# Patient Record
(demographics unavailable — no encounter records)

---

## 2024-10-09 NOTE — HISTORY OF PRESENT ILLNESS
[de-identified] : CC:  I was found to have a mass.   He is here at the request of Cuate Culver  This is a 82-year-old male with history of  hyperlipidemia, hypothyroidism, frequent APCs, non-sustained atrial Tachycardia, HTN, former smoker he was asked to see us to evaluate necrotic mass in the left perinephric space.   He had a CT scan done on 4/24/2024 in radiology services of [place] which demonstrated 4.5 x 3.5 x 4 cm solid enhancing mass with large area of central low attenuation change in the inferior aspect of the right perinephric space abuts the lower pole of the right kidney without interdigistation.  Distally in the lateral canal fascia there is a mass measuring 1.6 x 1.5 x 1.5 cm.   There is a large amount of retroperitoneal fat extending along the right lateral conal fascia into the right inguinal fossa  Differential on the CAT scan by the radiologist included sarcoma, renal cell carcinoma, lymphoma and of course a biopsy was recommended.  He had a CT abdo/pelvis in [facility] 2020: No acute traumatic abnormality within the chest abdomen and pelvis.  He feels well and has no complaints. [de-identified] : 6/13/24 He is here for follow up. He had a biopsy done on 5/29/24 that showed addendum.  This addendum is to report the results of ancillary studies and to update the final diagnosis. Final Diagnosis RIGHT PERINEPHRIC MASS; CT GUIDED CORE NEEDLE BIOPSY AND TOUCH IMPRINTS: - Diffuse large B-cell lymphoma, NOS. - Germinal center B-cell type. - Ki-67, > 90%. - P53 positive. - See comment. COMMENT: Large B-cell lymphoma FISH panel is in progress, and the results will be reported in an addendum. ANCILLARY STUDIES: Concurrent flow cytometry analysis ([id number]): - 41.0% monotypic B-cells are identified, consistent with B-cell lymphoproliferative disorder. Since the last time I saw him, he developed pain that is traveling into his right arm as well as had sciatica-like pain on the right side as well at this point away. He also noticed a lump on his scalp. Otherwise, no new complaints.  7/1/24 Patient is 82-year-old male is here for recently diagnosed Germinal center type diffuse large B cell Lymphoma. He also had a scalp lesion and right arm pain possibly from lymphoma. He was started on mini RCHOP. Tolerated cycle 1 well. His scalp lesion resolved with chemotherapy. He longer has pain in right arm.  7/18/2024 He returns for follow-up.  He is due for next cycle of chemotherapy tomorrow CXBC with just mild anemia. He feels well. No complaints.  08/08/2024 accompanied by his wife; Reports feeling well at the baseline of his health status, no changes in chronic conditions or new complaints since the last visit.  08/29/2024 accompanied by his wife and daughter; Reports feeling well at the baseline of his health status, no changes in chronic conditions or new complaints since the last visit.  09/19/2024 He is here today for follow-up accompanied by his wife.  They report he is having mild fatigue.  No other major issues.  10/9/2024 He is here for follow.  He has last cycle of chemotherapy for 10/11/2024. CBC today with just mild anemia. He had pain for a few days after chemotherapy started right after which sounds it was from Neulasta so I will add on Claritin.  More fatigue, muscle cramps also had a cough and is finally feeling better now he was COVID-negative

## 2024-10-09 NOTE — PHYSICAL EXAM
[Restricted in physically strenuous activity but ambulatory and able to carry out work of a light or sedentary nature] : Status 1- Restricted in physically strenuous activity but ambulatory and able to carry out work of a light or sedentary nature, e.g., light house work, office work [Normal] : affect appropriate [de-identified] : had about 1 cm a lump on scalp maybe lymphoma - scalp lump no longer palpable.

## 2024-10-09 NOTE — CONSULT LETTER
[Dear  ___] : Dear  [unfilled], [Consult Letter:] : I had the pleasure of evaluating your patient, [unfilled]. [Please see my note below.] : Please see my note below. [Consult Closing:] : Thank you very much for allowing me to participate in the care of this patient.  If you have any questions, please do not hesitate to contact me. [Sincerely,] : Sincerely, [FreeTextEntry3] : Abelardo

## 2024-10-09 NOTE — ASSESSMENT
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 Stage:  Stage IV (Numerous foci involving scalp, peritoneal, RP nodules and vertebra) Positive MYC rearrangement; No evidence of BCL 2 or BCL 6 translocation. CNS IPI High risk.  But would not offer CNS prophylaxis given there is lack of efficacy and unlikely would tolerate high-dose methotrexate after completion of chemotherapy. - 04/24/2024 CT scan: 4.5 x 3.5 x 4 cm solid enhancing mass with large area of central low attenuation change in the inferior aspect of the right perinephric space abuts the lower pole of the right kidney without interdigitation.  Distally in the lateral canal fascia there is a mass measuring 1.6 x 1.5 x 1.5 cm.  - Biopsy of the perinephric mass came back as Diffuse large B-cell lymphoma, NOS. Germinal center B-cell type. - 06/24/2024 PET scan: Numerous foci of pathological uptake compatible with tumor activity involving scalp, peritoneal and retroperitoneal soft tissue nodules, left perinephric soft tissue nodule, T1-T2 vertebra, right S1 and S 2 and mass like soft tissue thickening involving a short segment of small bowel within the pelvis. - 06/24/2024 ECHO - Normal EF 56%. - HIV, Hep negative. - LDH was just above normal, but hemolyzed. - 06/24/2024 Started on mini RCHOP - C1 Tolerated well. - 09/05/2024 CT C/A/P - No evidence of acute thoracic pathology. Old rib fractures. Since June 20, 2024, no new abdominal or pelvic adenopathy/soft tissue mass. Substantial interval improvement in previously noted peritoneal/retroperitoneal soft tissue nodules/masses, with a residual 3.3 cm right perinephric soft tissue mass. Substantial interval improvement in previously noted right sacral soft tissue mass, with mild residual soft tissue density involving the right S2 foramen. .  PLAN: - continue mini RCHOP for total of 6 cycles - C6/6 GIVE TOMORROW 10/11/2024 - continue supportive medications and knows how to use him. - repeat PET.CT  after completion of his treatment ordered - Labs today: CBC CMP -Also has insomnia he took his daughter's Ambien and slept well 10 mg so I agreed to give him Ambien for 2 weeks possibly not sleeping well because of the prednisone RTC in 3 weeks

## 2024-11-01 NOTE — PHYSICAL EXAM
[Restricted in physically strenuous activity but ambulatory and able to carry out work of a light or sedentary nature] : Status 1- Restricted in physically strenuous activity but ambulatory and able to carry out work of a light or sedentary nature, e.g., light house work, office work [Normal] : affect appropriate [de-identified] : had about 1 cm a lump on scalp maybe lymphoma - scalp lump no longer palpable.

## 2024-11-01 NOTE — PHYSICAL EXAM
[Restricted in physically strenuous activity but ambulatory and able to carry out work of a light or sedentary nature] : Status 1- Restricted in physically strenuous activity but ambulatory and able to carry out work of a light or sedentary nature, e.g., light house work, office work [Normal] : affect appropriate [de-identified] : had about 1 cm a lump on scalp maybe lymphoma - scalp lump no longer palpable.

## 2024-11-01 NOTE — HISTORY OF PRESENT ILLNESS
[de-identified] : CC:  I was found to have a mass.   He is here at the request of Cuate Culver  This is a 82-year-old male with history of  hyperlipidemia, hypothyroidism, frequent APCs, non-sustained atrial Tachycardia, HTN, former smoker he was asked to see us to evaluate necrotic mass in the left perinephric space.   He had a CT scan done on 4/24/2024 in radiology services of [place] which demonstrated 4.5 x 3.5 x 4 cm solid enhancing mass with large area of central low attenuation change in the inferior aspect of the right perinephric space abuts the lower pole of the right kidney without interdigistation.  Distally in the lateral canal fascia there is a mass measuring 1.6 x 1.5 x 1.5 cm.   There is a large amount of retroperitoneal fat extending along the right lateral conal fascia into the right inguinal fossa  Differential on the CAT scan by the radiologist included sarcoma, renal cell carcinoma, lymphoma and of course a biopsy was recommended.  He had a CT abdo/pelvis in [facility] 2020: No acute traumatic abnormality within the chest abdomen and pelvis.  He feels well and has no complaints. [de-identified] : 6/13/24 He is here for follow up. He had a biopsy done on 5/29/24 that showed addendum.  This addendum is to report the results of ancillary studies and to update the final diagnosis. Final Diagnosis RIGHT PERINEPHRIC MASS; CT GUIDED CORE NEEDLE BIOPSY AND TOUCH IMPRINTS: - Diffuse large B-cell lymphoma, NOS. - Germinal center B-cell type. - Ki-67, > 90%. - P53 positive. - See comment. COMMENT: Large B-cell lymphoma FISH panel is in progress, and the results will be reported in an addendum. ANCILLARY STUDIES: Concurrent flow cytometry analysis ([id number]): - 41.0% monotypic B-cells are identified, consistent with B-cell lymphoproliferative disorder. Since the last time I saw him, he developed pain that is traveling into his right arm as well as had sciatica-like pain on the right side as well at this point away. He also noticed a lump on his scalp. Otherwise, no new complaints.  7/1/24 Patient is 82-year-old male is here for recently diagnosed Germinal center type diffuse large B cell Lymphoma. He also had a scalp lesion and right arm pain possibly from lymphoma. He was started on mini RCHOP. Tolerated cycle 1 well. His scalp lesion resolved with chemotherapy. He longer has pain in right arm.  7/18/2024 He returns for follow-up.  He is due for next cycle of chemotherapy tomorrow CXBC with just mild anemia. He feels well. No complaints.  08/08/2024 accompanied by his wife; Reports feeling well at the baseline of his health status, no changes in chronic conditions or new complaints since the last visit.  08/29/2024 accompanied by his wife and daughter; Reports feeling well at the baseline of his health status, no changes in chronic conditions or new complaints since the last visit.  09/19/2024 He is here today for follow-up accompanied by his wife.  They report he is having mild fatigue.  No other major issues.  10/9/2024 He is here for follow.  He has last cycle of chemotherapy for 10/11/2024. CBC today with just mild anemia. He had pain for a few days after chemotherapy started right after which sounds it was from Neulasta so I will add on Claritin.  More fatigue, muscle cramps also had a cough and is finally feeling better now he was COVID-negative.  10/30/2024 He is here today for F/U accompanied by his wife and his daughter.  He remains to be extremely energetic at home.

## 2024-11-01 NOTE — END OF VISIT
[FreeTextEntry3] : I was physically present for the key portions of the evaluation and management service provided.  I agree with the history and physical, and plan which I have reviewed and edited where appropriate.  He is here for follow-up he completed his chemotherapy he is due for repeat PET scan on November 1 we will give him a call with results and he will come back in a few weeks to discuss as well if needed and to repeat blood work to make sure he is doing well.  Today blood work was done as well

## 2024-11-01 NOTE — ASSESSMENT
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 Stage:  Stage IV (Numerous foci involving scalp, peritoneal, RP nodules and vertebra) Positive MYC rearrangement; No evidence of BCL 2 or BCL 6 translocation. CNS IPI High risk.  But would not offer CNS prophylaxis given there is lack of efficacy and unlikely would tolerate high-dose methotrexate after completion of chemotherapy. - 04/24/2024 CT scan: 4.5 x 3.5 x 4 cm solid enhancing mass with large area of central low attenuation change in the inferior aspect of the right perinephric space abuts the lower pole of the right kidney without interdigitation.  Distally in the lateral canal fascia there is a mass measuring 1.6 x 1.5 x 1.5 cm.  - Biopsy of the perinephric mass came back as Diffuse large B-cell lymphoma, NOS. Germinal center B-cell type. - 06/24/2024 PET scan: Numerous foci of pathological uptake compatible with tumor activity involving scalp, peritoneal and retroperitoneal soft tissue nodules, left perinephric soft tissue nodule, T1-T2 vertebra, right S1 and S 2 and mass like soft tissue thickening involving a short segment of small bowel within the pelvis. - 06/24/2024 ECHO - Normal EF 56%. - HIV, Hep negative. - LDH was just above normal, but hemolyzed. - 06/24/2024 Started on mini RCHOP - C1 Tolerated well. - 09/05/2024 CT C/A/P - No evidence of acute thoracic pathology. Old rib fractures. Since June 20, 2024, no new abdominal or pelvic adenopathy/soft tissue mass. Substantial interval improvement in previously noted peritoneal/retroperitoneal soft tissue nodules/masses, with a residual 3.3 cm right perinephric soft tissue mass. Substantial interval improvement in previously noted right sacral soft tissue mass, with mild residual soft tissue density involving the right S2 foramen. - 10/11/2024 completed 6 cycles of mini RCHOP - tolerated well.  10/30/2024 Labs reviewed and results discussed with the patient his wife and daughter; he sleeps better with Ambien - remains clinically stable to continue current management. All questions were answered to satisfaction.  PLAN: - continue supportive medications PRN and knows how to use him. - repeat PET/CT after completion of his treatment - scheduled on 11/01/2024. - Labs today: CBC CMP RTC in 4 weeks with CBC CMP

## 2024-11-01 NOTE — HISTORY OF PRESENT ILLNESS
[de-identified] : CC:  I was found to have a mass.   He is here at the request of Cuate Culver  This is a 82-year-old male with history of  hyperlipidemia, hypothyroidism, frequent APCs, non-sustained atrial Tachycardia, HTN, former smoker he was asked to see us to evaluate necrotic mass in the left perinephric space.   He had a CT scan done on 4/24/2024 in radiology services of [place] which demonstrated 4.5 x 3.5 x 4 cm solid enhancing mass with large area of central low attenuation change in the inferior aspect of the right perinephric space abuts the lower pole of the right kidney without interdigistation.  Distally in the lateral canal fascia there is a mass measuring 1.6 x 1.5 x 1.5 cm.   There is a large amount of retroperitoneal fat extending along the right lateral conal fascia into the right inguinal fossa  Differential on the CAT scan by the radiologist included sarcoma, renal cell carcinoma, lymphoma and of course a biopsy was recommended.  He had a CT abdo/pelvis in [facility] 2020: No acute traumatic abnormality within the chest abdomen and pelvis.  He feels well and has no complaints. [de-identified] : 6/13/24 He is here for follow up. He had a biopsy done on 5/29/24 that showed addendum.  This addendum is to report the results of ancillary studies and to update the final diagnosis. Final Diagnosis RIGHT PERINEPHRIC MASS; CT GUIDED CORE NEEDLE BIOPSY AND TOUCH IMPRINTS: - Diffuse large B-cell lymphoma, NOS. - Germinal center B-cell type. - Ki-67, > 90%. - P53 positive. - See comment. COMMENT: Large B-cell lymphoma FISH panel is in progress, and the results will be reported in an addendum. ANCILLARY STUDIES: Concurrent flow cytometry analysis ([id number]): - 41.0% monotypic B-cells are identified, consistent with B-cell lymphoproliferative disorder. Since the last time I saw him, he developed pain that is traveling into his right arm as well as had sciatica-like pain on the right side as well at this point away. He also noticed a lump on his scalp. Otherwise, no new complaints.  7/1/24 Patient is 82-year-old male is here for recently diagnosed Germinal center type diffuse large B cell Lymphoma. He also had a scalp lesion and right arm pain possibly from lymphoma. He was started on mini RCHOP. Tolerated cycle 1 well. His scalp lesion resolved with chemotherapy. He longer has pain in right arm.  7/18/2024 He returns for follow-up.  He is due for next cycle of chemotherapy tomorrow CXBC with just mild anemia. He feels well. No complaints.  08/08/2024 accompanied by his wife; Reports feeling well at the baseline of his health status, no changes in chronic conditions or new complaints since the last visit.  08/29/2024 accompanied by his wife and daughter; Reports feeling well at the baseline of his health status, no changes in chronic conditions or new complaints since the last visit.  09/19/2024 He is here today for follow-up accompanied by his wife.  They report he is having mild fatigue.  No other major issues.  10/9/2024 He is here for follow.  He has last cycle of chemotherapy for 10/11/2024. CBC today with just mild anemia. He had pain for a few days after chemotherapy started right after which sounds it was from Neulasta so I will add on Claritin.  More fatigue, muscle cramps also had a cough and is finally feeling better now he was COVID-negative.  10/30/2024 He is here today for F/U accompanied by his wife and his daughter.  He remains to be extremely energetic at home.

## 2024-11-28 NOTE — PHYSICAL EXAM
[Restricted in physically strenuous activity but ambulatory and able to carry out work of a light or sedentary nature] : Status 1- Restricted in physically strenuous activity but ambulatory and able to carry out work of a light or sedentary nature, e.g., light house work, office work [de-identified] : had about 1 cm a lump on scalp maybe lymphoma - scalp lump no longer palpable. [Normal] : normal appearance, no rash, nodules, vesicles, ulcers, erythema

## 2024-11-28 NOTE — HISTORY OF PRESENT ILLNESS
[de-identified] : CC:  I was found to have a mass.   He is here at the request of Cuate Culver  This is a 82-year-old male with history of  hyperlipidemia, hypothyroidism, frequent APCs, non-sustained atrial Tachycardia, HTN, former smoker he was asked to see us to evaluate necrotic mass in the left perinephric space.   He had a CT scan done on 4/24/2024 in radiology services of [place] which demonstrated 4.5 x 3.5 x 4 cm solid enhancing mass with large area of central low attenuation change in the inferior aspect of the right perinephric space abuts the lower pole of the right kidney without interdigistation.  Distally in the lateral canal fascia there is a mass measuring 1.6 x 1.5 x 1.5 cm.   There is a large amount of retroperitoneal fat extending along the right lateral conal fascia into the right inguinal fossa  Differential on the CAT scan by the radiologist included sarcoma, renal cell carcinoma, lymphoma and of course a biopsy was recommended.  He had a CT abdo/pelvis in [facility] 2020: No acute traumatic abnormality within the chest abdomen and pelvis.  He feels well and has no complaints. [de-identified] : 6/13/24 He is here for follow up. He had a biopsy done on 5/29/24 that showed addendum.  This addendum is to report the results of ancillary studies and to update the final diagnosis. Final Diagnosis RIGHT PERINEPHRIC MASS; CT GUIDED CORE NEEDLE BIOPSY AND TOUCH IMPRINTS: - Diffuse large B-cell lymphoma, NOS. - Germinal center B-cell type. - Ki-67, > 90%. - P53 positive. - See comment. COMMENT: Large B-cell lymphoma FISH panel is in progress, and the results will be reported in an addendum. ANCILLARY STUDIES: Concurrent flow cytometry analysis ([id number]): - 41.0% monotypic B-cells are identified, consistent with B-cell lymphoproliferative disorder. Since the last time I saw him, he developed pain that is traveling into his right arm as well as had sciatica-like pain on the right side as well at this point away. He also noticed a lump on his scalp. Otherwise, no new complaints.  7/1/24 Patient is 82-year-old male is here for recently diagnosed Germinal center type diffuse large B cell Lymphoma. He also had a scalp lesion and right arm pain possibly from lymphoma. He was started on mini RCHOP. Tolerated cycle 1 well. His scalp lesion resolved with chemotherapy. He longer has pain in right arm.  7/18/2024 He returns for follow-up.  He is due for next cycle of chemotherapy tomorrow CXBC with just mild anemia. He feels well. No complaints.  08/08/2024 accompanied by his wife; Reports feeling well at the baseline of his health status, no changes in chronic conditions or new complaints since the last visit.  08/29/2024 accompanied by his wife and daughter; Reports feeling well at the baseline of his health status, no changes in chronic conditions or new complaints since the last visit.  09/19/2024 He is here today for follow-up accompanied by his wife.  They report he is having mild fatigue.  No other major issues.  10/9/2024 He is here for follow.  He has last cycle of chemotherapy for 10/11/2024. CBC today with just mild anemia. He had pain for a few days after chemotherapy started right after which sounds it was from Neulasta so I will add on Claritin.  More fatigue, muscle cramps also had a cough and is finally feeling better now he was COVID-negative.  10/30/2024 He is here today for F/U accompanied by his wife and his daughter.  He remains to be extremely energetic at home.  11/26/24 He is here for follow up.  He had PET.CT  complete resolution of all prior FDG avid lymph nodes and bone lesions.  FDG avid 3.3 cm right perinephric soft tissue mass is unchanged in size since CT on 9/5/2024, SUV max 4.1. It previously measured up to 4.8 cm with an SUV max of 30.5 (87% decrease).  Findings are consistent with a good treatment response.  Focal new FDG uptake, SUV max 5.4 seen towards the right posterior prostatic apex. This is indeterminate for biologic tumor activity.  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  Hands today on exam were darkish in color given his recent movement of equipment.  No new skin lesions and no adenopathy on examination.

## 2024-11-28 NOTE — ASSESSMENT
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 Stage:  Stage IV (Numerous foci involving scalp, peritoneal, RP nodules and vertebra) Positive MYC rearrangement; No evidence of BCL 2 or BCL 6 translocation. CNS IPI High risk.  But would not offer CNS prophylaxis given there is lack of efficacy and unlikely would tolerate high-dose methotrexate after completion of chemotherapy. - 04/24/2024 CT scan: 4.5 x 3.5 x 4 cm solid enhancing mass with large area of central low attenuation change in the inferior aspect of the right perinephric space abuts the lower pole of the right kidney without interdigitation.  Distally in the lateral canal fascia there is a mass measuring 1.6 x 1.5 x 1.5 cm.  - Biopsy of the perinephric mass came back as Diffuse large B-cell lymphoma, NOS. Germinal center B-cell type. - 06/24/2024 PET scan: Numerous foci of pathological uptake compatible with tumor activity involving scalp, peritoneal and retroperitoneal soft tissue nodules, left perinephric soft tissue nodule, T1-T2 vertebra, right S1 and S 2 and mass like soft tissue thickening involving a short segment of small bowel within the pelvis. - 06/24/2024 ECHO - Normal EF 56%. - HIV, Hep negative. - LDH was just above normal, but hemolyzed. - 06/24/2024 Started on mini RCHOP - C1 Tolerated well. - 09/05/2024 CT C/A/P - No evidence of acute thoracic pathology. Old rib fractures. Since June 20, 2024, no new abdominal or pelvic adenopathy/soft tissue mass. Substantial interval improvement in previously noted peritoneal/retroperitoneal soft tissue nodules/masses, with a residual 3.3 cm right perinephric soft tissue mass. Substantial interval improvement in previously noted right sacral soft tissue mass, with mild residual soft tissue density involving the right S2 foramen. - 10/11/2024 completed 6 cycles of mini RCHOP - tolerated well. -11/2024 PET.CT  NOAH  #Abnormal Foci in prostate on PET/CT  PLAN: - continue supportive medications PRN and knows how to use him. -blood work today  -will check PSA if elevated will have him see urology otherwise would just repeat PSA in 6-12 months and or her can see urology and get MR prostate  - Labs today: CBC CMP LDH PSA RTC in 3 months. Will possibly image again in 4-6 months vs just clinical following

## 2024-11-28 NOTE — HISTORY OF PRESENT ILLNESS
[de-identified] : CC:  I was found to have a mass.   He is here at the request of Cuate Culver  This is a 82-year-old male with history of  hyperlipidemia, hypothyroidism, frequent APCs, non-sustained atrial Tachycardia, HTN, former smoker he was asked to see us to evaluate necrotic mass in the left perinephric space.   He had a CT scan done on 4/24/2024 in radiology services of [place] which demonstrated 4.5 x 3.5 x 4 cm solid enhancing mass with large area of central low attenuation change in the inferior aspect of the right perinephric space abuts the lower pole of the right kidney without interdigistation.  Distally in the lateral canal fascia there is a mass measuring 1.6 x 1.5 x 1.5 cm.   There is a large amount of retroperitoneal fat extending along the right lateral conal fascia into the right inguinal fossa  Differential on the CAT scan by the radiologist included sarcoma, renal cell carcinoma, lymphoma and of course a biopsy was recommended.  He had a CT abdo/pelvis in [facility] 2020: No acute traumatic abnormality within the chest abdomen and pelvis.  He feels well and has no complaints. [de-identified] : 6/13/24 He is here for follow up. He had a biopsy done on 5/29/24 that showed addendum.  This addendum is to report the results of ancillary studies and to update the final diagnosis. Final Diagnosis RIGHT PERINEPHRIC MASS; CT GUIDED CORE NEEDLE BIOPSY AND TOUCH IMPRINTS: - Diffuse large B-cell lymphoma, NOS. - Germinal center B-cell type. - Ki-67, > 90%. - P53 positive. - See comment. COMMENT: Large B-cell lymphoma FISH panel is in progress, and the results will be reported in an addendum. ANCILLARY STUDIES: Concurrent flow cytometry analysis ([id number]): - 41.0% monotypic B-cells are identified, consistent with B-cell lymphoproliferative disorder. Since the last time I saw him, he developed pain that is traveling into his right arm as well as had sciatica-like pain on the right side as well at this point away. He also noticed a lump on his scalp. Otherwise, no new complaints.  7/1/24 Patient is 82-year-old male is here for recently diagnosed Germinal center type diffuse large B cell Lymphoma. He also had a scalp lesion and right arm pain possibly from lymphoma. He was started on mini RCHOP. Tolerated cycle 1 well. His scalp lesion resolved with chemotherapy. He longer has pain in right arm.  7/18/2024 He returns for follow-up.  He is due for next cycle of chemotherapy tomorrow CXBC with just mild anemia. He feels well. No complaints.  08/08/2024 accompanied by his wife; Reports feeling well at the baseline of his health status, no changes in chronic conditions or new complaints since the last visit.  08/29/2024 accompanied by his wife and daughter; Reports feeling well at the baseline of his health status, no changes in chronic conditions or new complaints since the last visit.  09/19/2024 He is here today for follow-up accompanied by his wife.  They report he is having mild fatigue.  No other major issues.  10/9/2024 He is here for follow.  He has last cycle of chemotherapy for 10/11/2024. CBC today with just mild anemia. He had pain for a few days after chemotherapy started right after which sounds it was from Neulasta so I will add on Claritin.  More fatigue, muscle cramps also had a cough and is finally feeling better now he was COVID-negative.  10/30/2024 He is here today for F/U accompanied by his wife and his daughter.  He remains to be extremely energetic at home.  11/26/24 He is here for follow up.  He had PET.CT  complete resolution of all prior FDG avid lymph nodes and bone lesions.  FDG avid 3.3 cm right perinephric soft tissue mass is unchanged in size since CT on 9/5/2024, SUV max 4.1. It previously measured up to 4.8 cm with an SUV max of 30.5 (87% decrease).  Findings are consistent with a good treatment response.  Focal new FDG uptake, SUV max 5.4 seen towards the right posterior prostatic apex. This is indeterminate for biologic tumor activity.  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  Hands today on exam were darkish in color given his recent movement of equipment.  No new skin lesions and no adenopathy on examination.

## 2024-11-28 NOTE — PHYSICAL EXAM
[Restricted in physically strenuous activity but ambulatory and able to carry out work of a light or sedentary nature] : Status 1- Restricted in physically strenuous activity but ambulatory and able to carry out work of a light or sedentary nature, e.g., light house work, office work [de-identified] : had about 1 cm a lump on scalp maybe lymphoma - scalp lump no longer palpable. [Normal] : normal appearance, no rash, nodules, vesicles, ulcers, erythema

## 2025-02-05 NOTE — ASSESSMENT
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 Stage:  Stage IV (Numerous foci involving scalp, peritoneal, RP nodules and vertebra) Positive MYC rearrangement; No evidence of BCL 2 or BCL 6 translocation. CNS IPI High risk.  But would not offer CNS prophylaxis given there is lack of efficacy and unlikely would tolerate high-dose methotrexate after completion of chemotherapy. - 04/24/2024 CT scan: 4.5 x 3.5 x 4 cm solid enhancing mass with large area of central low attenuation change in the inferior aspect of the right perinephric space abuts the lower pole of the right kidney without interdigitation.  Distally in the lateral canal fascia there is a mass measuring 1.6 x 1.5 x 1.5 cm.  - Biopsy of the perinephric mass came back as Diffuse large B-cell lymphoma, NOS. Germinal center B-cell type. - 06/24/2024 PET scan: Numerous foci of pathological uptake compatible with tumor activity involving scalp, peritoneal and retroperitoneal soft tissue nodules, left perinephric soft tissue nodule, T1-T2 vertebra, right S1 and S 2 and mass like soft tissue thickening involving a short segment of small bowel within the pelvis. - 06/24/2024 ECHO - Normal EF 56%. - HIV, Hep negative. - LDH was just above normal, but hemolyzed. - 06/24/2024 Started on mini RCHOP - C1 Tolerated well. - 09/05/2024 CT C/A/P - No evidence of acute thoracic pathology. Old rib fractures. Since June 20, 2024, no new abdominal or pelvic adenopathy/soft tissue mass. Substantial interval improvement in previously noted peritoneal/retroperitoneal soft tissue nodules/masses, with a residual 3.3 cm right perinephric soft tissue mass. Substantial interval improvement in previously noted right sacral soft tissue mass, with mild residual soft tissue density involving the right S2 foramen. - 10/11/2024 completed 6 cycles of mini RCHOP - tolerated well. - 11/01/2024 PET - Near complete resolution of all prior FDG avid lymph nodes and bone lesions. FDG avid 3.3 cm right perinephric soft tissue mass is unchanged in size since CT on 9/5/2024, SUV max 4.1. It previously measured up to 4.8 cm with an SUV max of 30.5 (87% decrease). Findings are consistent with a good treatment response. Focal new FDG uptake, SUV max 5.4 seen towards the right posterior prostatic apex. This is indeterminate for biologic tumor activity.  02/03/2025 Labs reviewed and results discussed with the patient and his wife - remains clinically stable to continue current management. All questions were answered to satisfaction.  PLAN: - continue PORT flush. - continue supportive medications PRN and knows how to use him. - repeat PET/CT - ordered. - Labs today: CBC CMP PSA RTC in 3 months with CBC CMP LDH

## 2025-02-05 NOTE — PHYSICAL EXAM
[Restricted in physically strenuous activity but ambulatory and able to carry out work of a light or sedentary nature] : Status 1- Restricted in physically strenuous activity but ambulatory and able to carry out work of a light or sedentary nature, e.g., light house work, office work [Normal] : affect appropriate [de-identified] : had about 1 cm a lump on scalp maybe lymphoma - scalp lump no longer palpable.

## 2025-02-05 NOTE — HISTORY OF PRESENT ILLNESS
[de-identified] : CC:  I was found to have a mass.   He is here at the request of Cuate Culver  This is a 82-year-old male with history of  hyperlipidemia, hypothyroidism, frequent APCs, non-sustained atrial Tachycardia, HTN, former smoker he was asked to see us to evaluate necrotic mass in the left perinephric space.   He had a CT scan done on 4/24/2024 in radiology services of [place] which demonstrated 4.5 x 3.5 x 4 cm solid enhancing mass with large area of central low attenuation change in the inferior aspect of the right perinephric space abuts the lower pole of the right kidney without interdigistation.  Distally in the lateral canal fascia there is a mass measuring 1.6 x 1.5 x 1.5 cm.   There is a large amount of retroperitoneal fat extending along the right lateral conal fascia into the right inguinal fossa  Differential on the CAT scan by the radiologist included sarcoma, renal cell carcinoma, lymphoma and of course a biopsy was recommended.  He had a CT abdo/pelvis in [facility] 2020: No acute traumatic abnormality within the chest abdomen and pelvis.  He feels well and has no complaints. [de-identified] : 6/13/24 He is here for follow up. He had a biopsy done on 5/29/24 that showed addendum.  This addendum is to report the results of ancillary studies and to update the final diagnosis. Final Diagnosis RIGHT PERINEPHRIC MASS; CT GUIDED CORE NEEDLE BIOPSY AND TOUCH IMPRINTS: - Diffuse large B-cell lymphoma, NOS. - Germinal center B-cell type. - Ki-67, > 90%. - P53 positive. - See comment. COMMENT: Large B-cell lymphoma FISH panel is in progress, and the results will be reported in an addendum. ANCILLARY STUDIES: Concurrent flow cytometry analysis ([id number]): - 41.0% monotypic B-cells are identified, consistent with B-cell lymphoproliferative disorder. Since the last time I saw him, he developed pain that is traveling into his right arm as well as had sciatica-like pain on the right side as well at this point away. He also noticed a lump on his scalp. Otherwise, no new complaints.  7/1/24 Patient is 82-year-old male is here for recently diagnosed Germinal center type diffuse large B cell Lymphoma. He also had a scalp lesion and right arm pain possibly from lymphoma. He was started on mini RCHOP. Tolerated cycle 1 well. His scalp lesion resolved with chemotherapy. He longer has pain in right arm.  7/18/2024 He returns for follow-up.  He is due for next cycle of chemotherapy tomorrow CXBC with just mild anemia. He feels well. No complaints.  08/08/2024 accompanied by his wife; Reports feeling well at the baseline of his health status, no changes in chronic conditions or new complaints since the last visit.  08/29/2024 accompanied by his wife and daughter; Reports feeling well at the baseline of his health status, no changes in chronic conditions or new complaints since the last visit.  09/19/2024 He is here today for follow-up accompanied by his wife.  They report he is having mild fatigue.  No other major issues.  10/9/2024 He is here for follow.  He has last cycle of chemotherapy for 10/11/2024. CBC today with just mild anemia. He had pain for a few days after chemotherapy started right after which sounds it was from Neulasta so I will add on Claritin.  More fatigue, muscle cramps also had a cough and is finally feeling better now he was COVID-negative.  10/30/2024 He is here today for F/U accompanied by his wife and his daughter.  He remains to be extremely energetic at home.  02/03/2025 accompanied by his wife Reports feeling well at the baseline of his health status, no changes in chronic conditions or new complaints since the last visit.

## 2025-02-05 NOTE — END OF VISIT
[FreeTextEntry3] : I was physically present for the key portions of the evaluation and management service provided.  I agree with the history and physical, and plan which I have reviewed and edited where appropriate.  Jean Claude is here for follow-up he is doing well blood work was done today he still has some activity in his last PET scan so we will repeat PET scan again if NOAH confirmed then we will just monitor with occasional CAT scans versus just wait for signs of symptoms he could see me back in 3 months with CBC CMP and LDH

## 2025-02-05 NOTE — PHYSICAL EXAM
[Restricted in physically strenuous activity but ambulatory and able to carry out work of a light or sedentary nature] : Status 1- Restricted in physically strenuous activity but ambulatory and able to carry out work of a light or sedentary nature, e.g., light house work, office work [Normal] : affect appropriate [de-identified] : had about 1 cm a lump on scalp maybe lymphoma - scalp lump no longer palpable.

## 2025-02-05 NOTE — PHYSICAL EXAM
[Restricted in physically strenuous activity but ambulatory and able to carry out work of a light or sedentary nature] : Status 1- Restricted in physically strenuous activity but ambulatory and able to carry out work of a light or sedentary nature, e.g., light house work, office work [Normal] : affect appropriate [de-identified] : had about 1 cm a lump on scalp maybe lymphoma - scalp lump no longer palpable.

## 2025-02-05 NOTE — HISTORY OF PRESENT ILLNESS
[de-identified] : CC:  I was found to have a mass.   He is here at the request of Cuate Culver  This is a 82-year-old male with history of  hyperlipidemia, hypothyroidism, frequent APCs, non-sustained atrial Tachycardia, HTN, former smoker he was asked to see us to evaluate necrotic mass in the left perinephric space.   He had a CT scan done on 4/24/2024 in radiology services of [place] which demonstrated 4.5 x 3.5 x 4 cm solid enhancing mass with large area of central low attenuation change in the inferior aspect of the right perinephric space abuts the lower pole of the right kidney without interdigistation.  Distally in the lateral canal fascia there is a mass measuring 1.6 x 1.5 x 1.5 cm.   There is a large amount of retroperitoneal fat extending along the right lateral conal fascia into the right inguinal fossa  Differential on the CAT scan by the radiologist included sarcoma, renal cell carcinoma, lymphoma and of course a biopsy was recommended.  He had a CT abdo/pelvis in [facility] 2020: No acute traumatic abnormality within the chest abdomen and pelvis.  He feels well and has no complaints. [de-identified] : 6/13/24 He is here for follow up. He had a biopsy done on 5/29/24 that showed addendum.  This addendum is to report the results of ancillary studies and to update the final diagnosis. Final Diagnosis RIGHT PERINEPHRIC MASS; CT GUIDED CORE NEEDLE BIOPSY AND TOUCH IMPRINTS: - Diffuse large B-cell lymphoma, NOS. - Germinal center B-cell type. - Ki-67, > 90%. - P53 positive. - See comment. COMMENT: Large B-cell lymphoma FISH panel is in progress, and the results will be reported in an addendum. ANCILLARY STUDIES: Concurrent flow cytometry analysis ([id number]): - 41.0% monotypic B-cells are identified, consistent with B-cell lymphoproliferative disorder. Since the last time I saw him, he developed pain that is traveling into his right arm as well as had sciatica-like pain on the right side as well at this point away. He also noticed a lump on his scalp. Otherwise, no new complaints.  7/1/24 Patient is 82-year-old male is here for recently diagnosed Germinal center type diffuse large B cell Lymphoma. He also had a scalp lesion and right arm pain possibly from lymphoma. He was started on mini RCHOP. Tolerated cycle 1 well. His scalp lesion resolved with chemotherapy. He longer has pain in right arm.  7/18/2024 He returns for follow-up.  He is due for next cycle of chemotherapy tomorrow CXBC with just mild anemia. He feels well. No complaints.  08/08/2024 accompanied by his wife; Reports feeling well at the baseline of his health status, no changes in chronic conditions or new complaints since the last visit.  08/29/2024 accompanied by his wife and daughter; Reports feeling well at the baseline of his health status, no changes in chronic conditions or new complaints since the last visit.  09/19/2024 He is here today for follow-up accompanied by his wife.  They report he is having mild fatigue.  No other major issues.  10/9/2024 He is here for follow.  He has last cycle of chemotherapy for 10/11/2024. CBC today with just mild anemia. He had pain for a few days after chemotherapy started right after which sounds it was from Neulasta so I will add on Claritin.  More fatigue, muscle cramps also had a cough and is finally feeling better now he was COVID-negative.  10/30/2024 He is here today for F/U accompanied by his wife and his daughter.  He remains to be extremely energetic at home.  02/03/2025 accompanied by his wife Reports feeling well at the baseline of his health status, no changes in chronic conditions or new complaints since the last visit.

## 2025-03-24 NOTE — HISTORY OF PRESENT ILLNESS
[TextBox_4] : 3/24/2025: Sent in from oncology because he has a history of large B-cell lymphoma.  PET scan showed possible uptake in the left hilar region.  To my read there is no corresponding lymph node or structure in the bronchus.  We will plan however for bronchoscopy with endobronchial ultrasound possibly.

## 2025-03-24 NOTE — END OF VISIT
[] : Fellow [FreeTextEntry3] : Not interested in ALE testing or treating \par  Convinced him to get home sleep study \par  Given smoking history will get LDCT for lung cancer screening \par  Otherwise no respiratory issues \par  F/U in 2 months

## 2025-03-24 NOTE — ASSESSMENT
[FreeTextEntry1] : Questionable hilar adenopathy on the left side with PET scan showing uptake Plan for bronchoscopy for airway inspection as well as endobronchial ultrasound with possible FNA of the left hilar lymph node Will call with results

## 2025-04-10 NOTE — HISTORY OF PRESENT ILLNESS
[de-identified] : CC:  I was found to have a mass.   He is here at the request of Cuate Culver  This is a 82-year-old male with history of  hyperlipidemia, hypothyroidism, frequent APCs, non-sustained atrial Tachycardia, HTN, former smoker he was asked to see us to evaluate necrotic mass in the left perinephric space.   He had a CT scan done on 4/24/2024 in radiology services of [place] which demonstrated 4.5 x 3.5 x 4 cm solid enhancing mass with large area of central low attenuation change in the inferior aspect of the right perinephric space abuts the lower pole of the right kidney without interdigistation.  Distally in the lateral canal fascia there is a mass measuring 1.6 x 1.5 x 1.5 cm.   There is a large amount of retroperitoneal fat extending along the right lateral conal fascia into the right inguinal fossa  Differential on the CAT scan by the radiologist included sarcoma, renal cell carcinoma, lymphoma and of course a biopsy was recommended.  He had a CT abdo/pelvis in [facility] 2020: No acute traumatic abnormality within the chest abdomen and pelvis.  He feels well and has no complaints. [de-identified] : 6/13/24 He is here for follow up. He had a biopsy done on 5/29/24 that showed addendum.  This addendum is to report the results of ancillary studies and to update the final diagnosis. Final Diagnosis RIGHT PERINEPHRIC MASS; CT GUIDED CORE NEEDLE BIOPSY AND TOUCH IMPRINTS: - Diffuse large B-cell lymphoma, NOS. - Germinal center B-cell type. - Ki-67, > 90%. - P53 positive. - See comment. COMMENT: Large B-cell lymphoma FISH panel is in progress, and the results will be reported in an addendum. ANCILLARY STUDIES: Concurrent flow cytometry analysis ([id number]): - 41.0% monotypic B-cells are identified, consistent with B-cell lymphoproliferative disorder. Since the last time I saw him, he developed pain that is traveling into his right arm as well as had sciatica-like pain on the right side as well at this point away. He also noticed a lump on his scalp. Otherwise, no new complaints.  7/1/24 Patient is 82-year-old male is here for recently diagnosed Germinal center type diffuse large B cell Lymphoma. He also had a scalp lesion and right arm pain possibly from lymphoma. He was started on mini RCHOP. Tolerated cycle 1 well. His scalp lesion resolved with chemotherapy. He longer has pain in right arm.  7/18/2024 He returns for follow-up.  He is due for next cycle of chemotherapy tomorrow CXBC with just mild anemia. He feels well. No complaints.  08/08/2024 accompanied by his wife; Reports feeling well at the baseline of his health status, no changes in chronic conditions or new complaints since the last visit.  08/29/2024 accompanied by his wife and daughter; Reports feeling well at the baseline of his health status, no changes in chronic conditions or new complaints since the last visit.  09/19/2024 He is here today for follow-up accompanied by his wife.  They report he is having mild fatigue.  No other major issues.  10/9/2024 He is here for follow.  He has last cycle of chemotherapy for 10/11/2024. CBC today with just mild anemia. He had pain for a few days after chemotherapy started right after which sounds it was from Neulasta so I will add on Claritin.  More fatigue, muscle cramps also had a cough and is finally feeling better now he was COVID-negative.  10/30/2024 He is here today for F/U accompanied by his wife and his daughter.  He remains to be extremely energetic at home.  02/03/2025 accompanied by his wife Reports feeling well at the baseline of his health status, no changes in chronic conditions or new complaints since the last visit.  4/10/2025 He is here for follow-up.  Since last visit he had a biopsy done by IR of the residual right-sided perinephric mass it just shows inflammation.  He then underwent an EBUS with Dr. Mohr.  He rosa maria out his left hemithorax a CT chest was done it showed reexpansion as well as MPRESSION:  There is a 4.9 x 4.6 x 3.7 cm mass arising in the left hilar region with encasement/invasion and narrowing of the left lower lobe bronchus, and encasement of the left upper lobe bronchus.  Compared with the chest x-ray of 4/7/2024 at 9:08 AM, there has been reexpansion of the left lung. Mild residual atelectatic changes are noted at the left lung base.  This was on 4/7/2025  The final pathology is not back yet but flow cytometry from the specimen level 2 node shows agnosis/Interpretation:     -  Immunophenotypic analysis reveals 5.82% kappa light chain-restricted, monotypic, mature B cells of large cell size, consistent with a B-cell lymphoma.  Additionally, there is an increase in CD8(+)/CD57(bright +) T cells (6.30% of the total cells and 46% of the T cell population. Please refer to the Note for clinical indications.  mmunophenotypic Findings: Immunophenotypic analysis reveals: 1. 5.82% monotypic, mature B lineage cells with intermediate to high forward scatter, low side scatter and the following immunophenotype: CD5 (-), CD10 (-), CD19 (+), CD20 (+), CD23 (-), CD38 (+), CD45 (dim +),  (-), surface kappa (+), surface lambda (-).  2. 6.30% CD8 (+)/CD57 (bright +) T cells (accounting for 46% of the total T cells) including 1.17% clonal gamma delta T cells showing TRBC1 (-), CD2 (dim +), and CD5 (dim+).  His main complaint is shortness of breath when he is going upstairs and wheezing he is taking a prednisone taper

## 2025-04-10 NOTE — PHYSICAL EXAM
[Restricted in physically strenuous activity but ambulatory and able to carry out work of a light or sedentary nature] : Status 1- Restricted in physically strenuous activity but ambulatory and able to carry out work of a light or sedentary nature, e.g., light house work, office work [Normal] : no JVD, no calf tenderness, venous stasis changes, varices [de-identified] : He has wheezing mainly in the left lungs [de-identified] : Port is fine [de-identified] : had about 1 cm a lump on scalp maybe lymphoma - scalp lump no longer palpable.

## 2025-04-10 NOTE — REVIEW OF SYSTEMS
[Fatigue] : fatigue [Negative] : Allergic/Immunologic [Wheezing] : wheezing [SOB on Exertion] : shortness of breath during exertion

## 2025-04-10 NOTE — ASSESSMENT
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 Stage:  Stage IV (Numerous foci involving scalp, peritoneal, RP nodules and vertebra) Positive MYC rearrangement; No evidence of BCL 2 or BCL 6 translocation. CNS IPI High risk.  But would not offer CNS prophylaxis given there is lack of efficacy and unlikely would tolerate high-dose methotrexate after completion of chemotherapy. - 04/24/2024 CT scan: 4.5 x 3.5 x 4 cm solid enhancing mass with large area of central low attenuation change in the inferior aspect of the right perinephric space abuts the lower pole of the right kidney without interdigitation.  Distally in the lateral canal fascia there is a mass measuring 1.6 x 1.5 x 1.5 cm.  - Biopsy of the perinephric mass came back as Diffuse large B-cell lymphoma, NOS. Germinal center B-cell type. - 06/24/2024 PET scan: Numerous foci of pathological uptake compatible with tumor activity involving scalp, peritoneal and retroperitoneal soft tissue nodules, left perinephric soft tissue nodule, T1-T2 vertebra, right S1 and S 2 and mass like soft tissue thickening involving a short segment of small bowel within the pelvis. - 06/24/2024 ECHO - Normal EF 56%. - HIV, Hep negative. - LDH was just above normal, but hemolyzed. - 06/24/2024 Started on mini RCHOP - C1 Tolerated well. - 09/05/2024 CT C/A/P - No evidence of acute thoracic pathology. Old rib fractures. Since June 20, 2024, no new abdominal or pelvic adenopathy/soft tissue mass. Substantial interval improvement in previously noted peritoneal/retroperitoneal soft tissue nodules/masses, with a residual 3.3 cm right perinephric soft tissue mass. Substantial interval improvement in previously noted right sacral soft tissue mass, with mild residual soft tissue density involving the right S2 foramen. - 10/11/2024 completed 6 cycles of mini RCHOP - tolerated well. - PET.CT 2/10 pathologic FDG uptake within the subaortic region, Persistent mild FDG uptake within a slightly decreased size 2.1 cm right perinephric soft tissue nodule, with max SUV 3.5 (previously 2.4 cm.) -EBUS4/7/2025 FLow is positive cytopathylogy still pending  PLAN: -would offer palliative radiation to Left Hilar mass to keep air way open they will make an appointment today as they are leaving -awaiting final path as well -we went over second line options - Liso-deloris  (CART), Bispecific antibody therapy ,  Glofitamab  or  Epcoritamab  -we dont have CART or  Bispecific antibody therapy in [facility] -I can offer Polatuzumab/bendamustine/rituximab vs  Tafasitamab/lenalidomide vs Loncastuximab.  I explained I should be able to obtain this medications - After discussion about CAR-T he really does not want to be admitted for long duration of time he is aware that it could lead to long-term survival benefit but he declined for referral - And after an extensive discussion about different efficacy different treatments we settled on  Tafasitamab/lenalidomide he does not have primary refractory disease or double hit  Tafasitamab is given 12 mg/kg by intravenous infusion according to the following schedule in 28-day cycles [19]:  -Cycle 1: Days 1, 4, 8, 15, and 22  -Cycles 2 and 3: Days 1, 8, 15, and 22  -Cycle 4 and beyond: Days 1 and 15  Lenalidomide 10 mg by mouth on days 1 to 21 of each cycle is taken in combination with tafasitamab for a maximum of 12 cycles.  We will start with 10 mg dose I will titrate if he tolerates it -Toxicity - Grade 3 AEs occurred in one-half of patients, including infusion reactions (mostly in the first two cycles), neutropenia (49 percent), infections (26 percent), pneumonia (7 percent), and febrile neutropenia (6 percent)  -Fatal AEs occurred in 5 percent of patients, including cerebrovascular accident, respiratory failure, progressive multifocal leukoencephalopathy, and sudden death.  Outcomes - The phase 2 L-MIND study reported that among 80 patients with r/r DLBCL treated with tafasitamab plus lenalidomide, SOMMERS was 60 percent (43 percent CR) with 22-month duration of response (BARRON) median overall survival (OS) was 34 months, median BARRON was nearly 44 months, and for patients who achieved  CR, three-year OS was >80 percent  Supportive medications such as Imodium were provided  I will see him back with cycle 1 day 15 [Palliative] : Goals of care discussed with patient: Palliative [Palliative Care Plan] : Patient was apprised of incurable nature of disease.  Hospice and Palliative care options discussed. [With Patient/Caregiver] : With Patient/Caregiver [AdvancecareDate] : 4/10/2025 [Full Code] : full code

## 2025-04-14 NOTE — REVIEW OF SYSTEMS
[Shortness Of Breath] : shortness of breath [Wheezing] : wheezing [Anxiety] : anxiety [Negative] : Allergic/Immunologic

## 2025-04-16 NOTE — VITALS
[8 - Distress Level] : Distress Level: 8 [Referred Patient  to social work for follow-up] : Patient was referred to social work for follow-up [Date: ____________] : Patient's last distress assessment performed on [unfilled]. [Maximal Pain Intensity: 0/10] : 0/10 [80: Normal activity with effort; some signs or symptoms of disease.] : 80: Normal activity with effort; some signs or symptoms of disease.  [FreeTextEntry7] : Patient is under a lot of stress; he is currently caring for wife who recently had a stroke.

## 2025-04-16 NOTE — HISTORY OF PRESENT ILLNESS
[FreeTextEntry1] : This is a 82-year-old male with history of hyperlipidemia, hypothyroidism, frequent APCs, non-sustained atrial Tachycardia, HTN, former smoker he was asked to see us to evaluate necrotic mass in the left perinephric space.  He had a CT scan done on 4/24/2024 in radiology services of [place] which demonstrated 4.5 x 3.5 x 4 cm solid enhancing mass with large area of central low attenuation change in the inferior aspect of the right perinephric space abuts the lower pole of the right kidney without interdigistation. Distally in the lateral canal fascia there is a mass measuring 1.6 x 1.5 x 1.5 cm.  There is a large amount of retroperitoneal fat extending along the right lateral conal fascia into the right inguinal fossa  Differential on the CAT scan by the radiologist included sarcoma, renal cell carcinoma, lymphoma and of course a biopsy was recommended.  He had a CT abdo/pelvis in [facility] 2020: No acute traumatic abnormality within the chest abdomen and pelvis.  He feels well and has no complaints.   Biopsy 5/29/24 RIGHT PERINEPHRIC MASS; CT GUIDED CORE NEEDLE BIOPSY AND TOUCH IMPRINTS: - Diffuse large B-cell lymphoma, NOS. - Germinal center B-cell type. - Ki-67, > 90%. - P53 positive.  10/11/2024 completed 6 cycles of mini RCHOP - tolerated well. 11/01/2024 PET - Near complete resolution of all prior FDG avid lymph nodes and bone lesion   PET.CT 2/10 pathologic FDG uptake within the subaortic region, Persistent mild FDG uptake within a slightly decreased size 2.1 cm right perinephric soft tissue nodule, with max SUV 3.5 (previously 2.4 cm.) he had a biopsy done by IR of the residual right-sided perinephric mass it just shows inflammation. He then underwent an EBUS with Dr. Mohr. He rosa maria out his left hemithorax a CT chest was done it showed reexpansion as well as MPRESSION:  There is a 4.9 x 4.6 x 3.7 cm mass arising in the left hilar region with encasement/invasion and narrowing of the left lower lobe bronchus, and encasement of the left upper lobe bronchus.  Pathology showed  Immunophenotypic analysis reveals 5.82% kappa light chain-restricted, monotypic, mature B cells of large cell size,  His main complaint is shortness of breath and cough which worsened after his biopsy He developed pneumothorax after his biopsy and was in the hospital last weekend.  Dr. Anand discussed 2nd line therapy with him at his last visit

## 2025-04-16 NOTE — HISTORY OF PRESENT ILLNESS
[FreeTextEntry1] : This is a 82-year-old male with history of hyperlipidemia, hypothyroidism, frequent APCs, non-sustained atrial Tachycardia, HTN, former smoker he was asked to see us to evaluate necrotic mass in the left perinephric space.  He had a CT scan done on 4/24/2024 in radiology services of [place] which demonstrated 4.5 x 3.5 x 4 cm solid enhancing mass with large area of central low attenuation change in the inferior aspect of the right perinephric space abuts the lower pole of the right kidney without interdigistation. Distally in the lateral canal fascia there is a mass measuring 1.6 x 1.5 x 1.5 cm.  There is a large amount of retroperitoneal fat extending along the right lateral conal fascia into the right inguinal fossa  Differential on the CAT scan by the radiologist included sarcoma, renal cell carcinoma, lymphoma and of course a biopsy was recommended.  He had a CT abdo/pelvis in [facility] 2020: No acute traumatic abnormality within the chest abdomen and pelvis.  He feels well and has no complaints.   Biopsy 5/29/24 RIGHT PERINEPHRIC MASS; CT GUIDED CORE NEEDLE BIOPSY AND TOUCH IMPRINTS: - Diffuse large B-cell lymphoma, NOS. - Germinal center B-cell type. - Ki-67, > 90%. - P53 positive.  10/11/2024 completed 6 cycles of mini RCHOP - tolerated well. 11/01/2024 PET - Near complete resolution of all prior FDG avid lymph nodes and bone lesion   PET.CT 2/10 pathologic FDG uptake within the subaortic region, Persistent mild FDG uptake within a slightly decreased size 2.1 cm right perinephric soft tissue nodule, with max SUV 3.5 (previously 2.4 cm.) he had a biopsy done by IR of the residual right-sided perinephric mass it just shows inflammation. He then underwent an EBUS with Dr. Mohr. He rosa maria out his left hemithorax a CT chest was done it showed reexpansion as well as MPRESSION:  There is a 4.9 x 4.6 x 3.7 cm mass arising in the left hilar region with encasement/invasion and narrowing of the left lower lobe bronchus, and encasement of the left upper lobe bronchus.  Pathology showed  Immunophenotypic analysis reveals 5.82% kappa light chain-restricted, monotypic, mature B cells of large cell size,  His main complaint is shortness of breath and cough which worsened after his biopsy He developed pneumothorax after his biopsy and was in the hospital last weekend.  Dr. Anand discussed 2nd line therapy with him at his last visit

## 2025-04-16 NOTE — PHYSICAL EXAM
[] : no respiratory distress [Respiration, Rhythm And Depth] : normal respiratory rhythm and effort [Exaggerated Use Of Accessory Muscles For Inspiration] : no accessory muscle use [Abdomen Soft] : soft [Nondistended] : nondistended [Normal] : oriented to person, place and time, the affect was normal, the mood was normal and not anxious [de-identified] : L sided wheezes

## 2025-04-16 NOTE — PHYSICAL EXAM
[] : no respiratory distress [Respiration, Rhythm And Depth] : normal respiratory rhythm and effort [Exaggerated Use Of Accessory Muscles For Inspiration] : no accessory muscle use [Abdomen Soft] : soft [Nondistended] : nondistended [Normal] : oriented to person, place and time, the affect was normal, the mood was normal and not anxious [de-identified] : L sided wheezes

## 2025-05-05 NOTE — ASSESSMENT
[Palliative] : Goals of care discussed with patient: Palliative [Palliative Care Plan] : Patient was apprised of incurable nature of disease.  Hospice and Palliative care options discussed. 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 Stage:  Stage IV (Numerous foci involving scalp, peritoneal, RP nodules and vertebra) Positive MYC rearrangement; No evidence of BCL 2 or BCL 6 translocation. CNS IPI High risk.  But would not offer CNS prophylaxis given there is lack of efficacy and unlikely would tolerate high-dose methotrexate after completion of chemotherapy. - 04/24/2024 CT scan: 4.5 x 3.5 x 4 cm solid enhancing mass with large area of central low attenuation change in the inferior aspect of the right perinephric space abuts the lower pole of the right kidney without interdigitation.  Distally in the lateral canal fascia there is a mass measuring 1.6 x 1.5 x 1.5 cm.  - Biopsy of the perinephric mass came back as Diffuse large B-cell lymphoma, NOS. Germinal center B-cell type. - 06/24/2024 PET scan: Numerous foci of pathological uptake compatible with tumor activity involving scalp, peritoneal and retroperitoneal soft tissue nodules, left perinephric soft tissue nodule, T1-T2 vertebra, right S1 and S 2 and mass like soft tissue thickening involving a short segment of small bowel within the pelvis. - 06/24/2024 ECHO - Normal EF 56%. - HIV, Hep negative. - LDH was just above normal, but hemolyzed. - 06/24/2024 Started on mini RCHOP - C1 Tolerated well. - 09/05/2024 CT C/A/P - No evidence of acute thoracic pathology. Old rib fractures. Since June 20, 2024, no new abdominal or pelvic adenopathy/soft tissue mass. Substantial interval improvement in previously noted peritoneal/retroperitoneal soft tissue nodules/masses, with a residual 3.3 cm right perinephric soft tissue mass. Substantial interval improvement in previously noted right sacral soft tissue mass, with mild residual soft tissue density involving the right S2 foramen. - 10/11/2024 completed 6 cycles of mini RCHOP - tolerated well. - PET.CT 2/10 pathologic FDG uptake within the subaortic region, Persistent mild FDG uptake within a slightly decreased size 2.1 cm right perinephric soft tissue nodule, with max SUV 3.5 (previously 2.4 cm.) -EBUS4/7/2025 FLow is positive cytopathology conformed it   PLAN: -He started Tafasitamab/lenalidomide on 4/21/2025 he is due for cycle 1 day 15 today -He is starting palliative radiation as well today to hopefully improve his air movement  Tafasitamab is given 12 mg/kg by intravenous infusion according to the following schedule in 28-day cycles [19]:  -Cycle 1: Days 1, 4, 8, 15, and 22  -Cycles 2 and 3: Days 1, 8, 15, and 22  -Cycle 4 and beyond: Days 1 and 15  Lenalidomide 10 mg by mouth on days 1 to 21 of each cycle is taken in combination with tafasitamab for a maximum of 12 cycles.  We will start with 10 mg dose I will titrate if he tolerates it, possibly will increase dose with cycle 3 versus keeping at the same we will see -Toxicity - Grade 3 AEs occurred in one-half of patients, including infusion reactions (mostly in the first two cycles), neutropenia (49 percent), infections (26 percent), pneumonia (7 percent), and febrile neutropenia (6 percent)  -Fatal AEs occurred in 5 percent of patients, including cerebrovascular accident, respiratory failure, progressive multifocal leukoencephalopathy, and sudden death.  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

## 2025-05-05 NOTE — PHYSICAL EXAM
[Restricted in physically strenuous activity but ambulatory and able to carry out work of a light or sedentary nature] : Status 1- Restricted in physically strenuous activity but ambulatory and able to carry out work of a light or sedentary nature, e.g., light house work, office work [Normal] : normal appearance, no rash, nodules, vesicles, ulcers, erythema [de-identified] : I did not hear wheezing anymore he still has some decreased air movement in the left lung field mostly in the lower lobe area [de-identified] : Port is fine

## 2025-05-05 NOTE — HISTORY OF PRESENT ILLNESS
[de-identified] : CC:  I was found to have a mass.   He is here at the request of Cuate Culver  This is a 82-year-old male with history of  hyperlipidemia, hypothyroidism, frequent APCs, non-sustained atrial Tachycardia, HTN, former smoker he was asked to see us to evaluate necrotic mass in the left perinephric space.   He had a CT scan done on 4/24/2024 in radiology services of [place] which demonstrated 4.5 x 3.5 x 4 cm solid enhancing mass with large area of central low attenuation change in the inferior aspect of the right perinephric space abuts the lower pole of the right kidney without interdigistation.  Distally in the lateral canal fascia there is a mass measuring 1.6 x 1.5 x 1.5 cm.   There is a large amount of retroperitoneal fat extending along the right lateral conal fascia into the right inguinal fossa  Differential on the CAT scan by the radiologist included sarcoma, renal cell carcinoma, lymphoma and of course a biopsy was recommended.  He had a CT abdo/pelvis in [facility] 2020: No acute traumatic abnormality within the chest abdomen and pelvis.  He feels well and has no complaints. [de-identified] : 6/13/24 He is here for follow up. He had a biopsy done on 5/29/24 that showed addendum.  This addendum is to report the results of ancillary studies and to update the final diagnosis. Final Diagnosis RIGHT PERINEPHRIC MASS; CT GUIDED CORE NEEDLE BIOPSY AND TOUCH IMPRINTS: - Diffuse large B-cell lymphoma, NOS. - Germinal center B-cell type. - Ki-67, > 90%. - P53 positive. - See comment. COMMENT: Large B-cell lymphoma FISH panel is in progress, and the results will be reported in an addendum. ANCILLARY STUDIES: Concurrent flow cytometry analysis ([id number]): - 41.0% monotypic B-cells are identified, consistent with B-cell lymphoproliferative disorder. Since the last time I saw him, he developed pain that is traveling into his right arm as well as had sciatica-like pain on the right side as well at this point away. He also noticed a lump on his scalp. Otherwise, no new complaints.  7/1/24 Patient is 82-year-old male is here for recently diagnosed Germinal center type diffuse large B cell Lymphoma. He also had a scalp lesion and right arm pain possibly from lymphoma. He was started on mini RCHOP. Tolerated cycle 1 well. His scalp lesion resolved with chemotherapy. He longer has pain in right arm.  7/18/2024 He returns for follow-up.  He is due for next cycle of chemotherapy tomorrow CXBC with just mild anemia. He feels well. No complaints.  08/08/2024 accompanied by his wife; Reports feeling well at the baseline of his health status, no changes in chronic conditions or new complaints since the last visit.  08/29/2024 accompanied by his wife and daughter; Reports feeling well at the baseline of his health status, no changes in chronic conditions or new complaints since the last visit.  09/19/2024 He is here today for follow-up accompanied by his wife.  They report he is having mild fatigue.  No other major issues.  10/9/2024 He is here for follow.  He has last cycle of chemotherapy for 10/11/2024. CBC today with just mild anemia. He had pain for a few days after chemotherapy started right after which sounds it was from Neulasta so I will add on Claritin.  More fatigue, muscle cramps also had a cough and is finally feeling better now he was COVID-negative.  10/30/2024 He is here today for F/U accompanied by his wife and his daughter.  He remains to be extremely energetic at home.  02/03/2025 accompanied by his wife Reports feeling well at the baseline of his health status, no changes in chronic conditions or new complaints since the last visit.  4/10/2025 He is here for follow-up.  Since last visit he had a biopsy done by IR of the residual right-sided perinephric mass it just shows inflammation.  He then underwent an EBUS with Dr. Mohr.  He rosa maria out his left hemithorax a CT chest was done it showed reexpansion as well as MPRESSION:  There is a 4.9 x 4.6 x 3.7 cm mass arising in the left hilar region with encasement/invasion and narrowing of the left lower lobe bronchus, and encasement of the left upper lobe bronchus.  Compared with the chest x-ray of 4/7/2024 at 9:08 AM, there has been reexpansion of the left lung. Mild residual atelectatic changes are noted at the left lung base.  This was on 4/7/2025  The final pathology is not back yet but flow cytometry from the specimen level 2 node shows agnosis/Interpretation:     -  Immunophenotypic analysis reveals 5.82% kappa light chain-restricted, monotypic, mature B cells of large cell size, consistent with a B-cell lymphoma.  Additionally, there is an increase in CD8(+)/CD57(bright +) T cells (6.30% of the total cells and 46% of the T cell population. Please refer to the Note for clinical indications.  mmunophenotypic Findings: Immunophenotypic analysis reveals: 1. 5.82% monotypic, mature B lineage cells with intermediate to high forward scatter, low side scatter and the following immunophenotype: CD5 (-), CD10 (-), CD19 (+), CD20 (+), CD23 (-), CD38 (+), CD45 (dim +),  (-), surface kappa (+), surface lambda (-).  2. 6.30% CD8 (+)/CD57 (bright +) T cells (accounting for 46% of the total T cells) including 1.17% clonal gamma delta T cells showing TRBC1 (-), CD2 (dim +), and CD5 (dim+).  His main complaint is shortness of breath when he is going upstairs and wheezing he is taking a prednisone taper  5/5/2025 He is here for follow up. He started  Tafasitamab/lenalidomide  on 4/21/2025 due for cycle 1 day 15 today. 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  The pain sometimes 7 out of 10 he does not take anything for it he is starting radiation today.  He reports that overall his breathing has improved and wheezing has improved as well he does use albuterol on occasion

## 2025-05-05 NOTE — REVIEW OF SYSTEMS
[Fatigue] : fatigue [Wheezing] : wheezing [SOB on Exertion] : shortness of breath during exertion [Negative] : Allergic/Immunologic [Recent Change In Weight] : ~T recent weight change [Joint Pain] : joint pain [Muscle Pain] : muscle pain

## 2025-05-13 NOTE — PHYSICAL EXAM
[General Appearance - Well Developed] : well developed [General Appearance - Alert] : alert [General Appearance - In No Acute Distress] : in no acute distress [Heart Rate And Rhythm] : heart rate and rhythm were normal [Heart Sounds] : normal S1 and S2 [Normal] : no focal deficits [Oriented To Time, Place, And Person] : oriented to person, place, and time

## 2025-05-13 NOTE — DISEASE MANAGEMENT
[Clinical] : TNM Stage: c [IV] : IV [TTNM] : x [NTNM] : x [MTNM] : 1 [de-identified] : 600cGy [de-identified] : 3000cGy [de-identified] : Left Hilum

## 2025-05-13 NOTE — DISEASE MANAGEMENT
[Clinical] : TNM Stage: c [IV] : IV [TTNM] : x [NTNM] : x [MTNM] : 1 [de-identified] : 600cGy [de-identified] : 3000cGy [de-identified] : Left Hilum

## 2025-05-13 NOTE — REVIEW OF SYSTEMS
[Nausea: Grade 0] : Nausea: Grade 0 [Fatigue: Grade 1 - Fatigue relieved by rest] : Fatigue: Grade 1 - Fatigue relieved by rest [Cough: Grade 0] : Cough: Grade 0 [Dyspnea: Grade 0] : Dyspnea: Grade 0

## 2025-05-13 NOTE — DISEASE MANAGEMENT
[Clinical] : TNM Stage: c [IV] : IV [TTNM] : x [NTNM] : x [MTNM] : 1 [de-identified] : 1374cGy [de-identified] : 3000cGy [de-identified] : Left Hilum

## 2025-05-13 NOTE — HISTORY OF PRESENT ILLNESS
[FreeTextEntry1] : 5/13/2025 OTV 7/10 treatments to the left hilum: Patient feeling well. Patient has gained 5 lbs. Appetite is good. Denies any shortness of breath and cough. It has improved since starting treatment. Chemo is on Mondays with Dr Anand. Pox 99% on RA. He continues Revlimid po as prescribed as well.   5/6/2025 OTV: 2 of 10 treatments to the left hilum:

## 2025-05-19 NOTE — HISTORY OF PRESENT ILLNESS
[de-identified] : CC:  I was found to have a mass.   He is here at the request of Cuate Culver  This is a 82-year-old male with history of  hyperlipidemia, hypothyroidism, frequent APCs, non-sustained atrial Tachycardia, HTN, former smoker he was asked to see us to evaluate necrotic mass in the left perinephric space.   He had a CT scan done on 4/24/2024 in radiology services of [place] which demonstrated 4.5 x 3.5 x 4 cm solid enhancing mass with large area of central low attenuation change in the inferior aspect of the right perinephric space abuts the lower pole of the right kidney without interdigistation.  Distally in the lateral canal fascia there is a mass measuring 1.6 x 1.5 x 1.5 cm.   There is a large amount of retroperitoneal fat extending along the right lateral conal fascia into the right inguinal fossa  Differential on the CAT scan by the radiologist included sarcoma, renal cell carcinoma, lymphoma and of course a biopsy was recommended.  He had a CT abdo/pelvis in [facility] 2020: No acute traumatic abnormality within the chest abdomen and pelvis.  He feels well and has no complaints. [de-identified] : 6/13/24 He is here for follow up. He had a biopsy done on 5/29/24 that showed addendum.  This addendum is to report the results of ancillary studies and to update the final diagnosis. Final Diagnosis RIGHT PERINEPHRIC MASS; CT GUIDED CORE NEEDLE BIOPSY AND TOUCH IMPRINTS: - Diffuse large B-cell lymphoma, NOS. - Germinal center B-cell type. - Ki-67, > 90%. - P53 positive. - See comment. COMMENT: Large B-cell lymphoma FISH panel is in progress, and the results will be reported in an addendum. ANCILLARY STUDIES: Concurrent flow cytometry analysis ([id number]): - 41.0% monotypic B-cells are identified, consistent with B-cell lymphoproliferative disorder. Since the last time I saw him, he developed pain that is traveling into his right arm as well as had sciatica-like pain on the right side as well at this point away. He also noticed a lump on his scalp. Otherwise, no new complaints.  7/1/24 Patient is 82-year-old male is here for recently diagnosed Germinal center type diffuse large B cell Lymphoma. He also had a scalp lesion and right arm pain possibly from lymphoma. He was started on mini RCHOP. Tolerated cycle 1 well. His scalp lesion resolved with chemotherapy. He longer has pain in right arm.  7/18/2024 He returns for follow-up.  He is due for next cycle of chemotherapy tomorrow CXBC with just mild anemia. He feels well. No complaints.  08/08/2024 accompanied by his wife; Reports feeling well at the baseline of his health status, no changes in chronic conditions or new complaints since the last visit.  08/29/2024 accompanied by his wife and daughter; Reports feeling well at the baseline of his health status, no changes in chronic conditions or new complaints since the last visit.  09/19/2024 He is here today for follow-up accompanied by his wife.  They report he is having mild fatigue.  No other major issues.  10/9/2024 He is here for follow.  He has last cycle of chemotherapy for 10/11/2024. CBC today with just mild anemia. He had pain for a few days after chemotherapy started right after which sounds it was from Neulasta so I will add on Claritin.  More fatigue, muscle cramps also had a cough and is finally feeling better now he was COVID-negative.  10/30/2024 He is here today for F/U accompanied by his wife and his daughter.  He remains to be extremely energetic at home.  02/03/2025 accompanied by his wife Reports feeling well at the baseline of his health status, no changes in chronic conditions or new complaints since the last visit.  4/10/2025 He is here for follow-up.  Since last visit he had a biopsy done by IR of the residual right-sided perinephric mass it just shows inflammation.  He then underwent an EBUS with Dr. Mohr.  He rosa maria out his left hemithorax a CT chest was done it showed reexpansion as well as MPRESSION:  There is a 4.9 x 4.6 x 3.7 cm mass arising in the left hilar region with encasement/invasion and narrowing of the left lower lobe bronchus, and encasement of the left upper lobe bronchus.  Compared with the chest x-ray of 4/7/2024 at 9:08 AM, there has been reexpansion of the left lung. Mild residual atelectatic changes are noted at the left lung base.  This was on 4/7/2025  The final pathology is not back yet but flow cytometry from the specimen level 2 node shows agnosis/Interpretation:     -  Immunophenotypic analysis reveals 5.82% kappa light chain-restricted, monotypic, mature B cells of large cell size, consistent with a B-cell lymphoma.  Additionally, there is an increase in CD8(+)/CD57(bright +) T cells (6.30% of the total cells and 46% of the T cell population. Please refer to the Note for clinical indications.  mmunophenotypic Findings: Immunophenotypic analysis reveals: 1. 5.82% monotypic, mature B lineage cells with intermediate to high forward scatter, low side scatter and the following immunophenotype: CD5 (-), CD10 (-), CD19 (+), CD20 (+), CD23 (-), CD38 (+), CD45 (dim +),  (-), surface kappa (+), surface lambda (-).  2. 6.30% CD8 (+)/CD57 (bright +) T cells (accounting for 46% of the total T cells) including 1.17% clonal gamma delta T cells showing TRBC1 (-), CD2 (dim +), and CD5 (dim+).  His main complaint is shortness of breath when he is going upstairs and wheezing he is taking a prednisone taper  5/5/2025 He is here for follow up. He started  Tafasitamab/lenalidomide  on 4/21/2025 due for cycle 1 day 15 today. 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  The pain sometimes 7 out of 10 he does not take anything for it he is starting radiation today.  He reports that overall his breathing has improved and wheezing has improved as well he does use albuterol on occasion  5/19/2025 He is here for follow up He is due for cycle 2D1 today.  He feels well has no complaints today except insomnia continues to work

## 2025-05-19 NOTE — REVIEW OF SYSTEMS
[Fatigue] : fatigue [Recent Change In Weight] : ~T recent weight change [Wheezing] : wheezing [SOB on Exertion] : shortness of breath during exertion [Joint Pain] : joint pain [Muscle Pain] : muscle pain [Negative] : Allergic/Immunologic

## 2025-05-19 NOTE — PHYSICAL EXAM
[Restricted in physically strenuous activity but ambulatory and able to carry out work of a light or sedentary nature] : Status 1- Restricted in physically strenuous activity but ambulatory and able to carry out work of a light or sedentary nature, e.g., light house work, office work [Normal] : affect appropriate [de-identified] : He was clear today no wheezing [de-identified] : Port is fine

## 2025-05-19 NOTE — ASSESSMENT
[Palliative] : Goals of care discussed with patient: Palliative [Palliative Care Plan] : Patient was apprised of incurable nature of disease.  Hospice and Palliative care options discussed. 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 Stage:  Stage IV (Numerous foci involving scalp, peritoneal, RP nodules and vertebra) Positive MYC rearrangement; No evidence of BCL 2 or BCL 6 translocation. CNS IPI High risk.  But would not offer CNS prophylaxis given there is lack of efficacy and unlikely would tolerate high-dose methotrexate after completion of chemotherapy. - 04/24/2024 CT scan: 4.5 x 3.5 x 4 cm solid enhancing mass with large area of central low attenuation change in the inferior aspect of the right perinephric space abuts the lower pole of the right kidney without interdigitation.  Distally in the lateral canal fascia there is a mass measuring 1.6 x 1.5 x 1.5 cm.  - Biopsy of the perinephric mass came back as Diffuse large B-cell lymphoma, NOS. Germinal center B-cell type. - 06/24/2024 PET scan: Numerous foci of pathological uptake compatible with tumor activity involving scalp, peritoneal and retroperitoneal soft tissue nodules, left perinephric soft tissue nodule, T1-T2 vertebra, right S1 and S 2 and mass like soft tissue thickening involving a short segment of small bowel within the pelvis. - 06/24/2024 ECHO - Normal EF 56%. - HIV, Hep negative. - LDH was just above normal, but hemolyzed. - 06/24/2024 Started on mini RCHOP - C1 Tolerated well. - 10/11/2024 completed 6 cycles of mini RCHOP - tolerated well. - PET.CT 2/10 pathologic FDG uptake within the subaortic region, Persistent mild FDG uptake within a slightly decreased size 2.1 cm right perinephric soft tissue nodule, with max SUV 3.5 (previously 2.4 cm.) -EBUS4/7/2025 Flow is positive cytopathology conformed it   PLAN: -He started 2L Tafasitamab/lenalidomide on 4/21/2025 he is due for cycle 2D1 CBC is fine and he will continue -completed radiation to paraaortic area on 5/16/2025 -Will order CT chest abdomen pelvis with next cycle, cycle 3    Tafasitamab is given 12 mg/kg by intravenous infusion according to the following schedule in 28-day cycles [19]:  -Cycle 1: Days 1, 4, 8, 15, and 22  -Cycles 2 and 3: Days 1, 8, 15, and 22  -Cycle 4 and beyond: Days 1 and 15  Lenalidomide 10 mg by mouth on days 1 to 21 of each cycle is taken in combination with tafasitamab for a maximum of 12 cycles.  We will start with 10 mg dose I will titrate if he tolerates it, possibly will increase dose with cycle 3 versus keeping at the same we will see  -Toxicity - Grade 3 AEs occurred in one-half of patients, including infusion reactions (mostly in the first two cycles), neutropenia (49 percent), infections (26 percent), pneumonia (7 percent), and febrile neutropenia (6 percent)  -Fatal AEs occurred in 5 percent of patients, including cerebrovascular accident, respiratory failure, progressive multifocal leukoencephalopathy, and sudden death.  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

## 2025-06-06 NOTE — HISTORY OF PRESENT ILLNESS
[FreeTextEntry1] : 82 year old male with PMHx HLD, of presents for a cardiac evaluation. He was referred by his PCP for an abnormal ECG. He denies palpitations, CP, SOB, dizziness.   GFR 86.  Patient presents for a follow up. He is undergoing chemo and radiation for lymphoma. Denies chest pain, SOB, or palpitations.  He underwent bronchoscopy with EBUS and biopsy today for evaluation of a PET-avid left lung lesion discovered on February 2025 PET scan. During the procedure, an extensive endobronchial lesion was noted. Post-procedure, patient developed cough, hypoxia, and shortness of breath. Chest X-ray showed left lung atelectasis /white out. He was sent to ER. He was weaned off HFNC and treated with steroids, nebs and empiric abx.

## 2025-06-06 NOTE — ASSESSMENT
[FreeTextEntry1] : 82 Y/O M HLD. HTN controlled without medications. Atrial bigeminy. Episodes of AT. Patient is asymptomatic. Severe SB at night with pauses up to 2.3 sec. Good chronotropic response, no evidence of ischemia in the past.  Plan: Holter monitor for 1 week. Fasting blood work ordered. Continue treatment.  F/u in 1 year.  Miller Acosta MD

## 2025-06-06 NOTE — CARDIOLOGY SUMMARY
[de-identified] : 6/6/25: SR with PACs.  [de-identified] : Holter: SR, PACs (37%), runs of atrial tachycardia. Sinus bradycardia at night (pauses up to 2.3 sec, slowest HR 27/min). [de-identified] : 6/2020: \par  LVEF 55-60%\par  Grade I diastolic dysfunction\par  mild TR\par  mildly elevated PASP

## 2025-06-26 NOTE — DISEASE MANAGEMENT
Group Therapy Documentation    PATIENT'S NAME: Saroj Summers  MRN:   [id number]  :   1959  ACCT. NUMBER: [id number]  DATE OF SERVICE: 3/25/21  START TIME:  3:00 PM  END TIME:  4:00 PM  FACILITATOR(S): Jayda Byrd LADC; Denice Hearn LADC; Saroj Kennedy [facility]  TOPIC: BEH Group Therapy  Number of patients attending the group: 23  Group Length:  1 Hours    Group Therapy Type: Recovery strategies    Summary of Group / Topics Discussed:    Disease of addiction      Group Attendance:  Attended group session    Patient's response to the group topic/interactions:  cooperative with task    Patient appeared to be Attentive and Engaged.        Client specific details: Herb participated in Skills Group lecture presented by Dr. Rosalva Vincent.     [TTNM] : x [NTNM] : x [MTNM] : 1 [de-identified] : 3000cGy [de-identified] : 3000cGy [de-identified] : Left Hilum

## 2025-06-26 NOTE — DISEASE MANAGEMENT
[TTNM] : x [NTNM] : x [MTNM] : 1 [de-identified] : 3000cGy [de-identified] : 3000cGy [de-identified] : Left Hilum

## 2025-06-26 NOTE — DISEASE MANAGEMENT
[TTNM] : x [NTNM] : x [MTNM] : 1 [de-identified] : 3000cGy [de-identified] : 3000cGy [de-identified] : Left Hilum

## 2025-06-26 NOTE — PHYSICAL EXAM
[General Appearance - Well Developed] : well developed [General Appearance - Alert] : alert [General Appearance - In No Acute Distress] : in no acute distress [Sclera] : the sclera and conjunctiva were normal [Examination Of The Oral Cavity] : the lips and gums were normal [Heart Rate And Rhythm] : heart rate and rhythm were normal [Heart Sounds] : normal S1 and S2 [Normal] : no focal deficits [Oriented To Time, Place, And Person] : oriented to person, place, and time

## 2025-06-26 NOTE — HISTORY OF PRESENT ILLNESS
[FreeTextEntry1] : 6/19/2025 PTE: 10Fx/3000cGy to the left hilum from 5/5/25-5/16/2025.  Jean Claude Varghese returns to clinic in follow up visit.  As you know, the patient is a 83 year old M with a diagnosis of diffuse large B cell lymphoma of the L hilum. The patient received 3000cGy to the Left hilum from 5/5/2025 through 5/16/2025.  I last saw him in May 2025. In the interim, the patient reports feeling well. He denies any cough or shortness of breath. He continues working. He continues Revlimid po and chemo with Dr Anand. He has right port for infusions. He has a CT chest/abdomen/pelvis scheduled for 7/23/2025. He denies any fatigue.   Upcoming appointments: Dr. Anand 7/14/25 5/13/2025 OTV 7/10 treatments to the left hilum: Patient feeling well. Patient has gained 5 lbs. Appetite is good. Denies any shortness of breath and cough. It has improved since starting treatment. Chemo is on Mondays with Dr Anand. Pox 99% on RA. He continues Revlimid po as prescribed as well.   5/6/2025 OTV: 2 of 10 treatments to the left hilum:

## 2025-06-26 NOTE — DISEASE MANAGEMENT
[TTNM] : x [NTNM] : x [MTNM] : 1 [de-identified] : 3000cGy [de-identified] : Left Hilum  [de-identified] : 3000cGy

## 2025-06-26 NOTE — REVIEW OF SYSTEMS
[Fatigue] : fatigue [Nocturia] : nocturia [Negative] : Psychiatric [Urinary Ostomy] : no ~T urinary ostomy present [Urinary Frequency] : no urinary frequency [FreeTextEntry8] : Takes Tamsulosin/

## 2025-06-26 NOTE — HISTORY OF PRESENT ILLNESS
[FreeTextEntry1] : This is a 82-year-old male with history of hyperlipidemia, hypothyroidism, frequent APCs, non-sustained atrial Tachycardia, HTN, former smoker he was asked to see us to evaluate necrotic mass in the left perinephric space.  He had a CT scan done on 4/24/2024 in radiology services of [place] which demonstrated 4.5 x 3.5 x 4 cm solid enhancing mass with large area of central low attenuation change in the inferior aspect of the right perinephric space abuts the lower pole of the right kidney without interdigistation. Distally in the lateral canal fascia there is a mass measuring 1.6 x 1.5 x 1.5 cm.  There is a large amount of retroperitoneal fat extending along the right lateral conal fascia into the right inguinal fossa  Differential on the CAT scan by the radiologist included sarcoma, renal cell carcinoma, lymphoma and of course a biopsy was recommended.  He had a CT abdo/pelvis in [facility] 2020: No acute traumatic abnormality within the chest abdomen and pelvis.  He feels well and has no complaints.   Biopsy 5/29/24 RIGHT PERINEPHRIC MASS; CT GUIDED CORE NEEDLE BIOPSY AND TOUCH IMPRINTS: - Diffuse large B-cell lymphoma, NOS. - Germinal center B-cell type. - Ki-67, > 90%. - P53 positive.  10/11/2024 completed 6 cycles of mini RCHOP - tolerated well. 11/01/2024 PET - Near complete resolution of all prior FDG avid lymph nodes and bone lesion   PET.CT 2/10 pathologic FDG uptake within the subaortic region, Persistent mild FDG uptake within a slightly decreased size 2.1 cm right perinephric soft tissue nodule, with max SUV 3.5 (previously 2.4 cm.) he had a biopsy done by IR of the residual right-sided perinephric mass it just shows inflammation. He then underwent an EBUS with Dr. Mohr. He rosa maria out his left hemithorax a CT chest was done it showed reexpansion as well as MPRESSION:  There is a 4.9 x 4.6 x 3.7 cm mass arising in the left hilar region with encasement/invasion and narrowing of the left lower lobe bronchus, and encasement of the left upper lobe bronchus.  Pathology showed  Immunophenotypic analysis reveals 5.82% kappa light chain-restricted, monotypic, mature B cells of large cell size,  His main complaint is shortness of breath and cough which worsened after his biopsy He developed pneumothorax after his biopsy and was in the hospital last weekend.  Dr. Anand discussed 2nd line therapy with him at his last visit

## 2025-07-14 NOTE — BEGINNING OF VISIT
[0] : 2) Feeling down, depressed, or hopeless: Not at all (0) [PHQ-2 Negative] : PHQ-2 Negative [PHQ-9 Negative] : PHQ-9 Negative [Former] : Former [> 15 Years] : > 15 Years [Date Discussed (MM/DD/YY): ___] : Discussed: [unfilled] [Reviewed, no changes] : Reviewed, no changes

## 2025-07-15 NOTE — PHYSICAL EXAM
[Restricted in physically strenuous activity but ambulatory and able to carry out work of a light or sedentary nature] : Status 1- Restricted in physically strenuous activity but ambulatory and able to carry out work of a light or sedentary nature, e.g., light house work, office work [Normal] : affect appropriate [de-identified] : Port is fine on the right side of the chest

## 2025-07-15 NOTE — ASSESSMENT
[Medication(s)] : Medication(s) [Palliative] : Goals of care discussed with patient: Palliative [Palliative Care Plan] : Patient was apprised of incurable nature of disease.  Hospice and Palliative care options discussed. 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 Stage:  Stage IV (Numerous foci involving scalp, peritoneal, RP nodules and vertebra) Positive MYC rearrangement; No evidence of BCL 2 or BCL 6 translocation. CNS IPI High risk.  But would not offer CNS prophylaxis given there is lack of efficacy and unlikely would tolerate high-dose methotrexate after completion of chemotherapy. - 04/24/2024 CT scan: 4.5 x 3.5 x 4 cm solid enhancing mass with large area of central low attenuation change in the inferior aspect of the right perinephric space abuts the lower pole of the right kidney without interdigitation.  Distally in the lateral canal fascia there is a mass measuring 1.6 x 1.5 x 1.5 cm.  - Biopsy of the perinephric mass came back as Diffuse large B-cell lymphoma, NOS. Germinal center B-cell type. - 06/24/2024 PET scan: Numerous foci of pathological uptake compatible with tumor activity involving scalp, peritoneal and retroperitoneal soft tissue nodules, left perinephric soft tissue nodule, T1-T2 vertebra, right S1 and S 2 and mass like soft tissue thickening involving a short segment of small bowel within the pelvis. - 06/24/2024 ECHO - Normal EF 56%. - HIV, Hep negative. - LDH was just above normal, but hemolyzed. - 06/24/2024 Started on mini RCHOP - C1 Tolerated well. - 10/11/2024 completed 6 cycles of mini RCHOP - tolerated well. - PET.CT 2/10 pathologic FDG uptake within the subaortic region, Persistent mild FDG uptake within a slightly decreased size 2.1 cm right perinephric soft tissue nodule, with max SUV 3.5 (previously 2.4 cm.) -EBUS4/7/2025 Flow is positive cytopathology conformed it   PLAN: -He started second line Tafasitamab/lenalidomide on 4/21/2025 he is due for cycle 4 D1 CBC is fine and he will continue - completed radiation to paraaortic area on 5/16/2025 -  CT chest abdomen pelvis scheduled for 7/23/25    Tafasitamab is given 12 mg/kg by intravenous infusion according to the following schedule in 28-day cycles [19]:  -Cycle 1: Days 1, 4, 8, 15, and 22  -Cycles 2 and 3: Days 1, 8, 15, and 22  -Cycle 4 and beyond: Days 1 and 15  Lenalidomide 10 mg by mouth on days 1 to 21 of each cycle is taken in combination with tafasitamab for a maximum of 12 cycles.  We will start with 10 mg dose he is doing well will continue with same dose since doing well  -Toxicity - Grade 3 AEs occurred in one-half of patients, including infusion reactions (mostly in the first two cycles), neutropenia (49 percent), infections (26 percent), pneumonia (7 percent), and febrile neutropenia (6 percent)  -Fatal AEs occurred in 5 percent of patients, including cerebrovascular accident, respiratory failure, progressive multifocal leukoencephalopathy, and sudden death.  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

## 2025-07-15 NOTE — HISTORY OF PRESENT ILLNESS
[de-identified] : CC:  I was found to have a mass.   He is here at the request of Cuate Culver  This is a 82-year-old male with history of  hyperlipidemia, hypothyroidism, frequent APCs, non-sustained atrial Tachycardia, HTN, former smoker he was asked to see us to evaluate necrotic mass in the left perinephric space.   He had a CT scan done on 4/24/2024 in radiology services of [place] which demonstrated 4.5 x 3.5 x 4 cm solid enhancing mass with large area of central low attenuation change in the inferior aspect of the right perinephric space abuts the lower pole of the right kidney without interdigistation.  Distally in the lateral canal fascia there is a mass measuring 1.6 x 1.5 x 1.5 cm.   There is a large amount of retroperitoneal fat extending along the right lateral conal fascia into the right inguinal fossa  Differential on the CAT scan by the radiologist included sarcoma, renal cell carcinoma, lymphoma and of course a biopsy was recommended.  He had a CT abdo/pelvis in [facility] 2020: No acute traumatic abnormality within the chest abdomen and pelvis.  He feels well and has no complaints. [de-identified] : 6/13/24 He is here for follow up. He had a biopsy done on 5/29/24 that showed addendum.  This addendum is to report the results of ancillary studies and to update the final diagnosis. Final Diagnosis RIGHT PERINEPHRIC MASS; CT GUIDED CORE NEEDLE BIOPSY AND TOUCH IMPRINTS: - Diffuse large B-cell lymphoma, NOS. - Germinal center B-cell type. - Ki-67, > 90%. - P53 positive. - See comment. COMMENT: Large B-cell lymphoma FISH panel is in progress, and the results will be reported in an addendum. ANCILLARY STUDIES: Concurrent flow cytometry analysis ([id number]): - 41.0% monotypic B-cells are identified, consistent with B-cell lymphoproliferative disorder. Since the last time I saw him, he developed pain that is traveling into his right arm as well as had sciatica-like pain on the right side as well at this point away. He also noticed a lump on his scalp. Otherwise, no new complaints.  7/1/24 Patient is 82-year-old male is here for recently diagnosed Germinal center type diffuse large B cell Lymphoma. He also had a scalp lesion and right arm pain possibly from lymphoma. He was started on mini RCHOP. Tolerated cycle 1 well. His scalp lesion resolved with chemotherapy. He longer has pain in right arm.  7/18/2024 He returns for follow-up.  He is due for next cycle of chemotherapy tomorrow CXBC with just mild anemia. He feels well. No complaints.  08/08/2024 accompanied by his wife; Reports feeling well at the baseline of his health status, no changes in chronic conditions or new complaints since the last visit.  08/29/2024 accompanied by his wife and daughter; Reports feeling well at the baseline of his health status, no changes in chronic conditions or new complaints since the last visit.  09/19/2024 He is here today for follow-up accompanied by his wife.  They report he is having mild fatigue.  No other major issues.  10/9/2024 He is here for follow.  He has last cycle of chemotherapy for 10/11/2024. CBC today with just mild anemia. He had pain for a few days after chemotherapy started right after which sounds it was from Neulasta so I will add on Claritin.  More fatigue, muscle cramps also had a cough and is finally feeling better now he was COVID-negative.  10/30/2024 He is here today for F/U accompanied by his wife and his daughter.  He remains to be extremely energetic at home.  02/03/2025 accompanied by his wife Reports feeling well at the baseline of his health status, no changes in chronic conditions or new complaints since the last visit.  4/10/2025 He is here for follow-up.  Since last visit he had a biopsy done by IR of the residual right-sided perinephric mass it just shows inflammation.  He then underwent an EBUS with Dr. Mohr.  He rosa maria out his left hemithorax a CT chest was done it showed reexpansion as well as MPRESSION:  There is a 4.9 x 4.6 x 3.7 cm mass arising in the left hilar region with encasement/invasion and narrowing of the left lower lobe bronchus, and encasement of the left upper lobe bronchus.  Compared with the chest x-ray of 4/7/2024 at 9:08 AM, there has been reexpansion of the left lung. Mild residual atelectatic changes are noted at the left lung base.  This was on 4/7/2025  The final pathology is not back yet but flow cytometry from the specimen level 2 node shows agnosis/Interpretation:     -  Immunophenotypic analysis reveals 5.82% kappa light chain-restricted, monotypic, mature B cells of large cell size, consistent with a B-cell lymphoma.  Additionally, there is an increase in CD8(+)/CD57(bright +) T cells (6.30% of the total cells and 46% of the T cell population. Please refer to the Note for clinical indications.  mmunophenotypic Findings: Immunophenotypic analysis reveals: 1. 5.82% monotypic, mature B lineage cells with intermediate to high forward scatter, low side scatter and the following immunophenotype: CD5 (-), CD10 (-), CD19 (+), CD20 (+), CD23 (-), CD38 (+), CD45 (dim +),  (-), surface kappa (+), surface lambda (-).  2. 6.30% CD8 (+)/CD57 (bright +) T cells (accounting for 46% of the total T cells) including 1.17% clonal gamma delta T cells showing TRBC1 (-), CD2 (dim +), and CD5 (dim+).  His main complaint is shortness of breath when he is going upstairs and wheezing he is taking a prednisone taper  5/5/2025 He is here for follow up. He started  Tafasitamab/lenalidomide  on 4/21/2025 due for cycle 1 day 15 today. 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  The pain sometimes 7 out of 10 he does not take anything for it he is starting radiation today.  He reports that overall his breathing has improved and wheezing has improved as well he does use albuterol on occasion  5/19/2025 He is here for follow up He is due for cycle 2D1 today.  He feels well has no complaints today except insomnia continues to work  6/16/2025 he is here for follow-up he is due for cycle 3 of treatment.  CBC today hemoglobin 11 neutrophil count is just under 1600 platelet count is normal.  He feels fine he has no complaints denies any pain overall doing well  7/14/25: Pt is here for follow up, he is due for cycle 4 Tafasitamab/Revlimid. He is complaining of Lt side chest pain for the past 4 days due to trauma. His BP and HR are low, he has sick sinus syndrome, he is asymptomatic.

## 2025-07-15 NOTE — PHYSICAL EXAM
[Restricted in physically strenuous activity but ambulatory and able to carry out work of a light or sedentary nature] : Status 1- Restricted in physically strenuous activity but ambulatory and able to carry out work of a light or sedentary nature, e.g., light house work, office work [Normal] : affect appropriate [de-identified] : Port is fine on the right side of the chest

## 2025-07-15 NOTE — PHYSICAL EXAM
[Restricted in physically strenuous activity but ambulatory and able to carry out work of a light or sedentary nature] : Status 1- Restricted in physically strenuous activity but ambulatory and able to carry out work of a light or sedentary nature, e.g., light house work, office work [Normal] : affect appropriate [de-identified] : Port is fine on the right side of the chest

## 2025-07-15 NOTE — HISTORY OF PRESENT ILLNESS
[de-identified] : CC:  I was found to have a mass.   He is here at the request of Cuate Culver  This is a 82-year-old male with history of  hyperlipidemia, hypothyroidism, frequent APCs, non-sustained atrial Tachycardia, HTN, former smoker he was asked to see us to evaluate necrotic mass in the left perinephric space.   He had a CT scan done on 4/24/2024 in radiology services of [place] which demonstrated 4.5 x 3.5 x 4 cm solid enhancing mass with large area of central low attenuation change in the inferior aspect of the right perinephric space abuts the lower pole of the right kidney without interdigistation.  Distally in the lateral canal fascia there is a mass measuring 1.6 x 1.5 x 1.5 cm.   There is a large amount of retroperitoneal fat extending along the right lateral conal fascia into the right inguinal fossa  Differential on the CAT scan by the radiologist included sarcoma, renal cell carcinoma, lymphoma and of course a biopsy was recommended.  He had a CT abdo/pelvis in [facility] 2020: No acute traumatic abnormality within the chest abdomen and pelvis.  He feels well and has no complaints. [de-identified] : 6/13/24 He is here for follow up. He had a biopsy done on 5/29/24 that showed addendum.  This addendum is to report the results of ancillary studies and to update the final diagnosis. Final Diagnosis RIGHT PERINEPHRIC MASS; CT GUIDED CORE NEEDLE BIOPSY AND TOUCH IMPRINTS: - Diffuse large B-cell lymphoma, NOS. - Germinal center B-cell type. - Ki-67, > 90%. - P53 positive. - See comment. COMMENT: Large B-cell lymphoma FISH panel is in progress, and the results will be reported in an addendum. ANCILLARY STUDIES: Concurrent flow cytometry analysis ([id number]): - 41.0% monotypic B-cells are identified, consistent with B-cell lymphoproliferative disorder. Since the last time I saw him, he developed pain that is traveling into his right arm as well as had sciatica-like pain on the right side as well at this point away. He also noticed a lump on his scalp. Otherwise, no new complaints.  7/1/24 Patient is 82-year-old male is here for recently diagnosed Germinal center type diffuse large B cell Lymphoma. He also had a scalp lesion and right arm pain possibly from lymphoma. He was started on mini RCHOP. Tolerated cycle 1 well. His scalp lesion resolved with chemotherapy. He longer has pain in right arm.  7/18/2024 He returns for follow-up.  He is due for next cycle of chemotherapy tomorrow CXBC with just mild anemia. He feels well. No complaints.  08/08/2024 accompanied by his wife; Reports feeling well at the baseline of his health status, no changes in chronic conditions or new complaints since the last visit.  08/29/2024 accompanied by his wife and daughter; Reports feeling well at the baseline of his health status, no changes in chronic conditions or new complaints since the last visit.  09/19/2024 He is here today for follow-up accompanied by his wife.  They report he is having mild fatigue.  No other major issues.  10/9/2024 He is here for follow.  He has last cycle of chemotherapy for 10/11/2024. CBC today with just mild anemia. He had pain for a few days after chemotherapy started right after which sounds it was from Neulasta so I will add on Claritin.  More fatigue, muscle cramps also had a cough and is finally feeling better now he was COVID-negative.  10/30/2024 He is here today for F/U accompanied by his wife and his daughter.  He remains to be extremely energetic at home.  02/03/2025 accompanied by his wife Reports feeling well at the baseline of his health status, no changes in chronic conditions or new complaints since the last visit.  4/10/2025 He is here for follow-up.  Since last visit he had a biopsy done by IR of the residual right-sided perinephric mass it just shows inflammation.  He then underwent an EBUS with Dr. Mohr.  He rosa maria out his left hemithorax a CT chest was done it showed reexpansion as well as MPRESSION:  There is a 4.9 x 4.6 x 3.7 cm mass arising in the left hilar region with encasement/invasion and narrowing of the left lower lobe bronchus, and encasement of the left upper lobe bronchus.  Compared with the chest x-ray of 4/7/2024 at 9:08 AM, there has been reexpansion of the left lung. Mild residual atelectatic changes are noted at the left lung base.  This was on 4/7/2025  The final pathology is not back yet but flow cytometry from the specimen level 2 node shows agnosis/Interpretation:     -  Immunophenotypic analysis reveals 5.82% kappa light chain-restricted, monotypic, mature B cells of large cell size, consistent with a B-cell lymphoma.  Additionally, there is an increase in CD8(+)/CD57(bright +) T cells (6.30% of the total cells and 46% of the T cell population. Please refer to the Note for clinical indications.  mmunophenotypic Findings: Immunophenotypic analysis reveals: 1. 5.82% monotypic, mature B lineage cells with intermediate to high forward scatter, low side scatter and the following immunophenotype: CD5 (-), CD10 (-), CD19 (+), CD20 (+), CD23 (-), CD38 (+), CD45 (dim +),  (-), surface kappa (+), surface lambda (-).  2. 6.30% CD8 (+)/CD57 (bright +) T cells (accounting for 46% of the total T cells) including 1.17% clonal gamma delta T cells showing TRBC1 (-), CD2 (dim +), and CD5 (dim+).  His main complaint is shortness of breath when he is going upstairs and wheezing he is taking a prednisone taper  5/5/2025 He is here for follow up. He started  Tafasitamab/lenalidomide  on 4/21/2025 due for cycle 1 day 15 today. 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  The pain sometimes 7 out of 10 he does not take anything for it he is starting radiation today.  He reports that overall his breathing has improved and wheezing has improved as well he does use albuterol on occasion  5/19/2025 He is here for follow up He is due for cycle 2D1 today.  He feels well has no complaints today except insomnia continues to work  6/16/2025 he is here for follow-up he is due for cycle 3 of treatment.  CBC today hemoglobin 11 neutrophil count is just under 1600 platelet count is normal.  He feels fine he has no complaints denies any pain overall doing well  7/14/25: Pt is here for follow up, he is due for cycle 4 Tafasitamab/Revlimid. He is complaining of Lt side chest pain for the past 4 days due to trauma. His BP and HR are low, he has sick sinus syndrome, he is asymptomatic.

## 2025-07-15 NOTE — REVIEW OF SYSTEMS
[Joint Pain] : joint pain [Muscle Pain] : muscle pain [Negative] : Allergic/Immunologic [Fatigue] : no fatigue [Recent Change In Weight] : ~T no recent weight change [Wheezing] : no wheezing [SOB on Exertion] : no shortness of breath during exertion [Dizziness] : no dizziness [Fainting] : no fainting

## 2025-07-15 NOTE — HISTORY OF PRESENT ILLNESS
[de-identified] : CC:  I was found to have a mass.   He is here at the request of Cuate Culver  This is a 82-year-old male with history of  hyperlipidemia, hypothyroidism, frequent APCs, non-sustained atrial Tachycardia, HTN, former smoker he was asked to see us to evaluate necrotic mass in the left perinephric space.   He had a CT scan done on 4/24/2024 in radiology services of [place] which demonstrated 4.5 x 3.5 x 4 cm solid enhancing mass with large area of central low attenuation change in the inferior aspect of the right perinephric space abuts the lower pole of the right kidney without interdigistation.  Distally in the lateral canal fascia there is a mass measuring 1.6 x 1.5 x 1.5 cm.   There is a large amount of retroperitoneal fat extending along the right lateral conal fascia into the right inguinal fossa  Differential on the CAT scan by the radiologist included sarcoma, renal cell carcinoma, lymphoma and of course a biopsy was recommended.  He had a CT abdo/pelvis in [facility] 2020: No acute traumatic abnormality within the chest abdomen and pelvis.  He feels well and has no complaints. [de-identified] : 6/13/24 He is here for follow up. He had a biopsy done on 5/29/24 that showed addendum.  This addendum is to report the results of ancillary studies and to update the final diagnosis. Final Diagnosis RIGHT PERINEPHRIC MASS; CT GUIDED CORE NEEDLE BIOPSY AND TOUCH IMPRINTS: - Diffuse large B-cell lymphoma, NOS. - Germinal center B-cell type. - Ki-67, > 90%. - P53 positive. - See comment. COMMENT: Large B-cell lymphoma FISH panel is in progress, and the results will be reported in an addendum. ANCILLARY STUDIES: Concurrent flow cytometry analysis ([id number]): - 41.0% monotypic B-cells are identified, consistent with B-cell lymphoproliferative disorder. Since the last time I saw him, he developed pain that is traveling into his right arm as well as had sciatica-like pain on the right side as well at this point away. He also noticed a lump on his scalp. Otherwise, no new complaints.  7/1/24 Patient is 82-year-old male is here for recently diagnosed Germinal center type diffuse large B cell Lymphoma. He also had a scalp lesion and right arm pain possibly from lymphoma. He was started on mini RCHOP. Tolerated cycle 1 well. His scalp lesion resolved with chemotherapy. He longer has pain in right arm.  7/18/2024 He returns for follow-up.  He is due for next cycle of chemotherapy tomorrow CXBC with just mild anemia. He feels well. No complaints.  08/08/2024 accompanied by his wife; Reports feeling well at the baseline of his health status, no changes in chronic conditions or new complaints since the last visit.  08/29/2024 accompanied by his wife and daughter; Reports feeling well at the baseline of his health status, no changes in chronic conditions or new complaints since the last visit.  09/19/2024 He is here today for follow-up accompanied by his wife.  They report he is having mild fatigue.  No other major issues.  10/9/2024 He is here for follow.  He has last cycle of chemotherapy for 10/11/2024. CBC today with just mild anemia. He had pain for a few days after chemotherapy started right after which sounds it was from Neulasta so I will add on Claritin.  More fatigue, muscle cramps also had a cough and is finally feeling better now he was COVID-negative.  10/30/2024 He is here today for F/U accompanied by his wife and his daughter.  He remains to be extremely energetic at home.  02/03/2025 accompanied by his wife Reports feeling well at the baseline of his health status, no changes in chronic conditions or new complaints since the last visit.  4/10/2025 He is here for follow-up.  Since last visit he had a biopsy done by IR of the residual right-sided perinephric mass it just shows inflammation.  He then underwent an EBUS with Dr. Mohr.  He rosa maria out his left hemithorax a CT chest was done it showed reexpansion as well as MPRESSION:  There is a 4.9 x 4.6 x 3.7 cm mass arising in the left hilar region with encasement/invasion and narrowing of the left lower lobe bronchus, and encasement of the left upper lobe bronchus.  Compared with the chest x-ray of 4/7/2024 at 9:08 AM, there has been reexpansion of the left lung. Mild residual atelectatic changes are noted at the left lung base.  This was on 4/7/2025  The final pathology is not back yet but flow cytometry from the specimen level 2 node shows agnosis/Interpretation:     -  Immunophenotypic analysis reveals 5.82% kappa light chain-restricted, monotypic, mature B cells of large cell size, consistent with a B-cell lymphoma.  Additionally, there is an increase in CD8(+)/CD57(bright +) T cells (6.30% of the total cells and 46% of the T cell population. Please refer to the Note for clinical indications.  mmunophenotypic Findings: Immunophenotypic analysis reveals: 1. 5.82% monotypic, mature B lineage cells with intermediate to high forward scatter, low side scatter and the following immunophenotype: CD5 (-), CD10 (-), CD19 (+), CD20 (+), CD23 (-), CD38 (+), CD45 (dim +),  (-), surface kappa (+), surface lambda (-).  2. 6.30% CD8 (+)/CD57 (bright +) T cells (accounting for 46% of the total T cells) including 1.17% clonal gamma delta T cells showing TRBC1 (-), CD2 (dim +), and CD5 (dim+).  His main complaint is shortness of breath when he is going upstairs and wheezing he is taking a prednisone taper  5/5/2025 He is here for follow up. He started  Tafasitamab/lenalidomide  on 4/21/2025 due for cycle 1 day 15 today. 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  The pain sometimes 7 out of 10 he does not take anything for it he is starting radiation today.  He reports that overall his breathing has improved and wheezing has improved as well he does use albuterol on occasion  5/19/2025 He is here for follow up He is due for cycle 2D1 today.  He feels well has no complaints today except insomnia continues to work  6/16/2025 he is here for follow-up he is due for cycle 3 of treatment.  CBC today hemoglobin 11 neutrophil count is just under 1600 platelet count is normal.  He feels fine he has no complaints denies any pain overall doing well  7/14/25: Pt is here for follow up, he is due for cycle 4 Tafasitamab/Revlimid. He is complaining of Lt side chest pain for the past 4 days due to trauma. His BP and HR are low, he has sick sinus syndrome, he is asymptomatic.

## 2025-07-15 NOTE — END OF VISIT
[] : Fellow [[email or web address]] : He is here for follow-up he is due for cycle 4 of Tafasitamab/lenalidomide.  He will now go to every 2 weeks of Tafasitamab.  He feels well he has pain now but this is due to injury he declined any other intervention he tried Celebrex but it did not help I did offer him an oxycodone but he was not interested in his daughter call me next day and I ordered oxycodone for him he could take half a tablet or 5 mg to see how he does at night and we can go from there he will see us back in a month blood work was done repeat imaging pending